# Patient Record
Sex: FEMALE | Race: WHITE | Employment: OTHER | URBAN - NONMETROPOLITAN AREA
[De-identification: names, ages, dates, MRNs, and addresses within clinical notes are randomized per-mention and may not be internally consistent; named-entity substitution may affect disease eponyms.]

---

## 2019-10-21 ENCOUNTER — HOSPITAL ENCOUNTER (OUTPATIENT)
Dept: CT IMAGING | Age: 79
Discharge: HOME | End: 2019-10-21
Payer: MEDICARE

## 2019-10-21 DIAGNOSIS — Z00.6 ENCOUNTER FOR EXAMINATION FOR NORMAL COMPARISON AND CONTROL IN CLINICAL RESEARCH PROGRAM: ICD-10-CM

## 2019-10-21 PROCEDURE — 3209999900 CT COMPARISON OF OUTSIDE FILMS

## 2019-10-22 ENCOUNTER — NURSE ONLY (OUTPATIENT)
Dept: LAB | Age: 79
End: 2019-10-22

## 2019-10-22 ENCOUNTER — OFFICE VISIT (OUTPATIENT)
Dept: PULMONOLOGY | Age: 79
End: 2019-10-22
Payer: MEDICARE

## 2019-10-22 VITALS
SYSTOLIC BLOOD PRESSURE: 132 MMHG | OXYGEN SATURATION: 98 % | HEART RATE: 76 BPM | DIASTOLIC BLOOD PRESSURE: 84 MMHG | TEMPERATURE: 98 F | BODY MASS INDEX: 24.66 KG/M2 | WEIGHT: 148 LBS | HEIGHT: 65 IN

## 2019-10-22 DIAGNOSIS — J47.9 BRONCHIECTASIS WITHOUT COMPLICATION (HCC): ICD-10-CM

## 2019-10-22 DIAGNOSIS — R93.89 ABNORMAL CT OF THE CHEST: Primary | ICD-10-CM

## 2019-10-22 DIAGNOSIS — R91.8 MULTIPLE LUNG NODULES ON CT: ICD-10-CM

## 2019-10-22 DIAGNOSIS — R93.89 ABNORMAL CT OF THE CHEST: ICD-10-CM

## 2019-10-22 LAB
RHEUMATOID FACTOR: 11 IU/ML (ref 0–13)
SEDIMENTATION RATE, ERYTHROCYTE: 14 MM/HR (ref 0–20)

## 2019-10-22 PROCEDURE — 99205 OFFICE O/P NEW HI 60 MIN: CPT | Performed by: INTERNAL MEDICINE

## 2019-10-22 RX ORDER — IBUPROFEN 200 MG
200 TABLET ORAL EVERY 6 HOURS PRN
COMMUNITY

## 2019-10-22 RX ORDER — AMLODIPINE BESYLATE 2.5 MG/1
2.5 TABLET ORAL DAILY
COMMUNITY

## 2019-10-22 RX ORDER — FLUTICASONE PROPIONATE 110 UG/1
2 AEROSOL, METERED RESPIRATORY (INHALATION) 2 TIMES DAILY
Qty: 1 INHALER | Refills: 11 | Status: SHIPPED | OUTPATIENT
Start: 2019-10-22 | End: 2019-10-23 | Stop reason: CLARIF

## 2019-10-22 RX ORDER — ALBUTEROL SULFATE 90 UG/1
2 AEROSOL, METERED RESPIRATORY (INHALATION) EVERY 6 HOURS PRN
Qty: 1 INHALER | Refills: 11 | Status: SHIPPED | OUTPATIENT
Start: 2019-10-22 | End: 2021-11-29

## 2019-10-23 ENCOUNTER — TELEPHONE (OUTPATIENT)
Dept: PULMONOLOGY | Age: 79
End: 2019-10-23

## 2019-10-23 DIAGNOSIS — J47.9 BRONCHIECTASIS WITHOUT COMPLICATION (HCC): Primary | ICD-10-CM

## 2019-10-25 ENCOUNTER — HOSPITAL ENCOUNTER (OUTPATIENT)
Age: 79
Setting detail: OUTPATIENT SURGERY
Discharge: HOME | End: 2019-10-25
Attending: INTERNAL MEDICINE | Admitting: INTERNAL MEDICINE
Payer: MEDICARE

## 2019-10-25 ENCOUNTER — ANESTHESIA (OUTPATIENT)
Dept: ENDOSCOPY | Age: 79
End: 2019-10-25
Payer: MEDICARE

## 2019-10-25 ENCOUNTER — APPOINTMENT (OUTPATIENT)
Dept: GENERAL RADIOLOGY | Age: 79
End: 2019-10-25
Attending: INTERNAL MEDICINE
Payer: MEDICARE

## 2019-10-25 ENCOUNTER — ANESTHESIA EVENT (OUTPATIENT)
Dept: ENDOSCOPY | Age: 79
End: 2019-10-25
Payer: MEDICARE

## 2019-10-25 VITALS
HEIGHT: 65 IN | OXYGEN SATURATION: 98 % | RESPIRATION RATE: 20 BRPM | HEART RATE: 77 BPM | DIASTOLIC BLOOD PRESSURE: 81 MMHG | SYSTOLIC BLOOD PRESSURE: 178 MMHG | TEMPERATURE: 96.9 F | WEIGHT: 146.6 LBS | BODY MASS INDEX: 24.43 KG/M2

## 2019-10-25 VITALS
DIASTOLIC BLOOD PRESSURE: 71 MMHG | SYSTOLIC BLOOD PRESSURE: 142 MMHG | OXYGEN SATURATION: 96 % | RESPIRATION RATE: 31 BRPM

## 2019-10-25 LAB
ANA SCREEN: NORMAL
ANGIOTENSIN CONVERTING ENZYME: 36 U/L (ref 9–67)
BAL CHARACTER: ABNORMAL
BAL COLLECTION SITE: ABNORMAL
BAL COLOR: ABNORMAL
CILIATED/EPITHELIAL CELLS BAL: 4 % (ref 0–5)
CYCLIC CITRULLIN PEPTIDE AB: 3 UNITS (ref 0–19)
CYTOLOGY-NON GYN: NORMAL
EOSINOPHILS BAL: 1 % (ref 0–1)
LYMPHOCYTES, BAL: 6 % (ref 10–15)
MACROPHAGE/MONOCYTE BAL: 4 % (ref 86–100)
PATHOLOGIST REVIEW: ABNORMAL
RBC BAL: 5170 /CUMM
SEGMENTED NEUTROPHILS, BAL: 85 % (ref 0–3)
TOTAL NUCLEATED CELLS BAL: 300 /CUMM
TOTAL VOLUME RECEIVED BAL: 40 ML

## 2019-10-25 PROCEDURE — 2500000003 HC RX 250 WO HCPCS: Performed by: NURSE ANESTHETIST, CERTIFIED REGISTERED

## 2019-10-25 PROCEDURE — 88312 SPECIAL STAINS GROUP 1: CPT

## 2019-10-25 PROCEDURE — 71045 X-RAY EXAM CHEST 1 VIEW: CPT

## 2019-10-25 PROCEDURE — 88305 TISSUE EXAM BY PATHOLOGIST: CPT

## 2019-10-25 PROCEDURE — 7100000001 HC PACU RECOVERY - ADDTL 15 MIN: Performed by: INTERNAL MEDICINE

## 2019-10-25 PROCEDURE — 87116 MYCOBACTERIA CULTURE: CPT

## 2019-10-25 PROCEDURE — 87081 CULTURE SCREEN ONLY: CPT

## 2019-10-25 PROCEDURE — 88104 CYTOPATH FL NONGYN SMEARS: CPT

## 2019-10-25 PROCEDURE — 7100000000 HC PACU RECOVERY - FIRST 15 MIN: Performed by: INTERNAL MEDICINE

## 2019-10-25 PROCEDURE — 31623 DX BRONCHOSCOPE/BRUSH: CPT | Performed by: INTERNAL MEDICINE

## 2019-10-25 PROCEDURE — 87278 LEGION PNEUMOPHILIA AG IF: CPT

## 2019-10-25 PROCEDURE — 87077 CULTURE AEROBIC IDENTIFY: CPT

## 2019-10-25 PROCEDURE — 6370000000 HC RX 637 (ALT 250 FOR IP)

## 2019-10-25 PROCEDURE — 2580000003 HC RX 258: Performed by: INTERNAL MEDICINE

## 2019-10-25 PROCEDURE — 3609027000 HC BRONCHOSCOPY: Performed by: INTERNAL MEDICINE

## 2019-10-25 PROCEDURE — 88108 CYTOPATH CONCENTRATE TECH: CPT

## 2019-10-25 PROCEDURE — 87075 CULTR BACTERIA EXCEPT BLOOD: CPT

## 2019-10-25 PROCEDURE — 3609010800 HC BRONCHOSCOPY ALVEOLAR LAVAGE: Performed by: INTERNAL MEDICINE

## 2019-10-25 PROCEDURE — 3700000000 HC ANESTHESIA ATTENDED CARE: Performed by: INTERNAL MEDICINE

## 2019-10-25 PROCEDURE — 3700000001 HC ADD 15 MINUTES (ANESTHESIA): Performed by: INTERNAL MEDICINE

## 2019-10-25 PROCEDURE — 2709999900 HC NON-CHARGEABLE SUPPLY: Performed by: INTERNAL MEDICINE

## 2019-10-25 PROCEDURE — 87255 GENET VIRUS ISOLATE HSV: CPT

## 2019-10-25 PROCEDURE — 31624 DX BRONCHOSCOPE/LAVAGE: CPT | Performed by: INTERNAL MEDICINE

## 2019-10-25 PROCEDURE — 87102 FUNGUS ISOLATION CULTURE: CPT

## 2019-10-25 PROCEDURE — 87206 SMEAR FLUORESCENT/ACID STAI: CPT

## 2019-10-25 PROCEDURE — 2500000003 HC RX 250 WO HCPCS: Performed by: INTERNAL MEDICINE

## 2019-10-25 PROCEDURE — 31625 BRONCHOSCOPY W/BIOPSY(S): CPT | Performed by: INTERNAL MEDICINE

## 2019-10-25 PROCEDURE — 6360000002 HC RX W HCPCS: Performed by: NURSE ANESTHETIST, CERTIFIED REGISTERED

## 2019-10-25 PROCEDURE — 3609011100 HC BRONCHOSCOPY BRUSHINGS: Performed by: INTERNAL MEDICINE

## 2019-10-25 PROCEDURE — 88112 CYTOPATH CELL ENHANCE TECH: CPT

## 2019-10-25 PROCEDURE — 87205 SMEAR GRAM STAIN: CPT

## 2019-10-25 PROCEDURE — 2500000003 HC RX 250 WO HCPCS

## 2019-10-25 PROCEDURE — 89051 BODY FLUID CELL COUNT: CPT

## 2019-10-25 PROCEDURE — 87070 CULTURE OTHR SPECIMN AEROBIC: CPT

## 2019-10-25 PROCEDURE — 6370000000 HC RX 637 (ALT 250 FOR IP): Performed by: INTERNAL MEDICINE

## 2019-10-25 PROCEDURE — 6360000002 HC RX W HCPCS

## 2019-10-25 RX ORDER — GLYCOPYRROLATE 1 MG/5 ML
SYRINGE (ML) INTRAVENOUS PRN
Status: DISCONTINUED | OUTPATIENT
Start: 2019-10-25 | End: 2019-10-25 | Stop reason: SDUPTHER

## 2019-10-25 RX ORDER — LIDOCAINE HYDROCHLORIDE 10 MG/ML
INJECTION, SOLUTION INFILTRATION; PERINEURAL PRN
Status: DISCONTINUED | OUTPATIENT
Start: 2019-10-25 | End: 2019-10-25 | Stop reason: ALTCHOICE

## 2019-10-25 RX ORDER — MIDAZOLAM HYDROCHLORIDE 1 MG/ML
INJECTION INTRAMUSCULAR; INTRAVENOUS PRN
Status: DISCONTINUED | OUTPATIENT
Start: 2019-10-25 | End: 2019-10-25 | Stop reason: SDUPTHER

## 2019-10-25 RX ORDER — PROPOFOL 10 MG/ML
INJECTION, EMULSION INTRAVENOUS PRN
Status: DISCONTINUED | OUTPATIENT
Start: 2019-10-25 | End: 2019-10-25 | Stop reason: SDUPTHER

## 2019-10-25 RX ORDER — SODIUM CHLORIDE 450 MG/100ML
INJECTION, SOLUTION INTRAVENOUS CONTINUOUS
Status: DISCONTINUED | OUTPATIENT
Start: 2019-10-25 | End: 2019-10-25 | Stop reason: HOSPADM

## 2019-10-25 RX ORDER — OXYMETAZOLINE HYDROCHLORIDE 0.05 G/100ML
SPRAY NASAL PRN
Status: DISCONTINUED | OUTPATIENT
Start: 2019-10-25 | End: 2019-10-25 | Stop reason: ALTCHOICE

## 2019-10-25 RX ORDER — LIDOCAINE HYDROCHLORIDE 20 MG/ML
INJECTION, SOLUTION INFILTRATION; PERINEURAL PRN
Status: DISCONTINUED | OUTPATIENT
Start: 2019-10-25 | End: 2019-10-25 | Stop reason: ALTCHOICE

## 2019-10-25 RX ADMIN — PROPOFOL 10 MG: 10 INJECTION, EMULSION INTRAVENOUS at 08:02

## 2019-10-25 RX ADMIN — PROPOFOL 10 MG: 10 INJECTION, EMULSION INTRAVENOUS at 07:48

## 2019-10-25 RX ADMIN — PROPOFOL 20 MG: 10 INJECTION, EMULSION INTRAVENOUS at 07:59

## 2019-10-25 RX ADMIN — PROPOFOL 30 MG: 10 INJECTION, EMULSION INTRAVENOUS at 07:37

## 2019-10-25 RX ADMIN — Medication 0.2 MG: at 07:28

## 2019-10-25 RX ADMIN — PROPOFOL 20 MG: 10 INJECTION, EMULSION INTRAVENOUS at 07:39

## 2019-10-25 RX ADMIN — PROPOFOL 20 MG: 10 INJECTION, EMULSION INTRAVENOUS at 07:54

## 2019-10-25 RX ADMIN — PROPOFOL 10 MG: 10 INJECTION, EMULSION INTRAVENOUS at 07:51

## 2019-10-25 RX ADMIN — PROPOFOL 10 MG: 10 INJECTION, EMULSION INTRAVENOUS at 07:45

## 2019-10-25 RX ADMIN — PROPOFOL 20 MG: 10 INJECTION, EMULSION INTRAVENOUS at 07:57

## 2019-10-25 RX ADMIN — PROPOFOL 10 MG: 10 INJECTION, EMULSION INTRAVENOUS at 07:42

## 2019-10-25 RX ADMIN — MIDAZOLAM HYDROCHLORIDE 2 MG: 1 INJECTION, SOLUTION INTRAMUSCULAR; INTRAVENOUS at 07:34

## 2019-10-25 RX ADMIN — SODIUM CHLORIDE: 4.5 INJECTION, SOLUTION INTRAVENOUS at 07:17

## 2019-10-25 ASSESSMENT — PAIN SCALES - GENERAL
PAINLEVEL_OUTOF10: 0

## 2019-10-25 ASSESSMENT — PAIN - FUNCTIONAL ASSESSMENT: PAIN_FUNCTIONAL_ASSESSMENT: 0-10

## 2019-10-26 LAB
ANTI JO-1 IGG: 0 AU/ML (ref 0–40)
ANTI RNP AB: 1 AU/ML (ref 0–40)
ANTI-SMITH: 0 AU/ML (ref 0–40)
CENTROMERE AB SCREEN: 0 AU/ML (ref 0–40)
SCLERODERMA (SCL-70) AB: 2 AU/ML (ref 0–40)

## 2019-10-27 LAB
GRAM STAIN RESULT: ABNORMAL
GRAM STAIN RESULT: ABNORMAL
LEGIONELLA PNEUMOPHILIA DFA SOURCE: NORMAL
LEGIONELLA, DFA: NEGATIVE
MISC. #1 REFERENCE GROUP TEST: NORMAL
ORGANISM: ABNORMAL
ORGANISM: ABNORMAL
RESPIRATORY CULTURE: ABNORMAL
RESPIRATORY CULTURE: ABNORMAL

## 2019-10-29 DIAGNOSIS — J14: Primary | ICD-10-CM

## 2019-10-29 RX ORDER — LEVOFLOXACIN 500 MG/1
500 TABLET, FILM COATED ORAL DAILY
Qty: 7 TABLET | Refills: 0 | Status: SHIPPED | OUTPATIENT
Start: 2019-10-29 | End: 2019-11-05

## 2019-10-30 ENCOUNTER — TELEPHONE (OUTPATIENT)
Dept: PULMONOLOGY | Age: 79
End: 2019-10-30

## 2019-10-30 LAB
AEROBIC CULTURE: ABNORMAL
ANAEROBIC CULTURE: ABNORMAL
GRAM STAIN RESULT: ABNORMAL
ORGANISM: ABNORMAL

## 2019-10-31 LAB
VIRAL CULTURE,RAPID,RESPIRATOR: NORMAL
VIRAL CULTURE,RAPID,RESPIRATOR: NORMAL

## 2019-11-02 LAB — MISC. #1 REFERENCE GROUP TEST: NORMAL

## 2019-11-03 LAB — LEGIONELLA SPECIES CULTURE: NORMAL

## 2019-11-11 ENCOUNTER — TELEPHONE (OUTPATIENT)
Dept: PULMONOLOGY | Age: 79
End: 2019-11-11

## 2019-11-18 ENCOUNTER — OFFICE VISIT (OUTPATIENT)
Dept: PULMONOLOGY | Age: 79
End: 2019-11-18
Payer: MEDICARE

## 2019-11-18 VITALS
SYSTOLIC BLOOD PRESSURE: 124 MMHG | HEIGHT: 65 IN | WEIGHT: 145.2 LBS | BODY MASS INDEX: 24.19 KG/M2 | DIASTOLIC BLOOD PRESSURE: 62 MMHG | TEMPERATURE: 99.5 F | OXYGEN SATURATION: 98 % | HEART RATE: 82 BPM

## 2019-11-18 DIAGNOSIS — J47.9 BRONCHIECTASIS WITHOUT COMPLICATION (HCC): ICD-10-CM

## 2019-11-18 DIAGNOSIS — R91.1 PULMONARY NODULE, LEFT: ICD-10-CM

## 2019-11-18 DIAGNOSIS — J01.00 ACUTE MAXILLARY SINUSITIS, RECURRENCE NOT SPECIFIED: Primary | ICD-10-CM

## 2019-11-18 DIAGNOSIS — R93.89 ABNORMAL CT OF THE CHEST: ICD-10-CM

## 2019-11-18 DIAGNOSIS — R91.1 INCIDENTAL LUNG NODULE, > 3MM AND < 8MM: ICD-10-CM

## 2019-11-18 PROCEDURE — 99215 OFFICE O/P EST HI 40 MIN: CPT | Performed by: INTERNAL MEDICINE

## 2019-11-18 RX ORDER — DOXYCYCLINE HYCLATE 100 MG/1
100 CAPSULE ORAL 2 TIMES DAILY
Qty: 16 CAPSULE | Refills: 0 | Status: SHIPPED | OUTPATIENT
Start: 2019-11-18 | End: 2019-11-26

## 2019-11-24 LAB
FUNGUS IDENTIFIED: NORMAL
FUNGUS SMEAR: NORMAL

## 2019-12-09 LAB
AFB CULTURE & SMEAR: NORMAL
AFB CULTURE & SMEAR: NORMAL

## 2020-01-02 ENCOUNTER — TELEPHONE (OUTPATIENT)
Dept: PULMONOLOGY | Age: 80
End: 2020-01-02

## 2020-01-02 NOTE — TELEPHONE ENCOUNTER
I called patient at given phone number i.e Home/Mobile and spoke with her over phone. I informed in detail about my plan as follows.  -Stop using her Q primo until her hoarseness of voice and redness of tongue completely resolved. She denies any white patches on her tongue or cheeks. To restart Q primo with a spacer device once her symptoms completely resolved. -Continue using Pro air HFA 2 puffs Q6h prn.  -she was given a prescription for a Spacer device to use with her current inhalers- sent to her pharmacy. I advised her to keep scheduled appointment with my clinic with out fail. She verbalizes understanding.

## 2020-01-02 NOTE — TELEPHONE ENCOUNTER
Tongue is red and sore, throat is hoarse. She is rinsing her mouth out after every use. Her dentist recommended a rinse ( Merly silver mouth rinse). Is ok to try this ?

## 2020-01-06 ENCOUNTER — TELEPHONE (OUTPATIENT)
Dept: PULMONOLOGY | Age: 80
End: 2020-01-06

## 2020-01-06 NOTE — TELEPHONE ENCOUNTER
Nighat with [facility] phoned wanting to clarify if Kieran Woodsonn should be using spacer with Qvar Redihaler. Qvar is Breath Activating and packing states not to use with spacer. Nighat just needing clarification? Please advise.

## 2020-01-07 NOTE — TELEPHONE ENCOUNTER
I called Hansel Vaz and explained the change and discussed with Nighat at [employer]. Both did verbalize understanding. Thank you.

## 2020-03-16 ENCOUNTER — TELEPHONE (OUTPATIENT)
Dept: PULMONOLOGY | Age: 80
End: 2020-03-16

## 2020-03-25 ENCOUNTER — TELEPHONE (OUTPATIENT)
Dept: PULMONOLOGY | Age: 80
End: 2020-03-25

## 2020-04-07 ENCOUNTER — TELEPHONE (OUTPATIENT)
Dept: PULMONOLOGY | Age: 80
End: 2020-04-07

## 2020-04-07 NOTE — TELEPHONE ENCOUNTER
Carmen Reyez phoned in complaining that she is still having soreness on her tongue and roof of her mouth, She has completed medication you had prescribed and is asking if would like to provide another order for Nystatin or what do you advise her to do? Carmen Reyez also mentioned that her  uses a CPAP at night and he uses Prime clean while using and she can notice a difference in the air, could this possibly be irritating her mouth? Please advise.

## 2020-04-17 NOTE — PROGRESS NOTES
bronchopneumonia along with Centrilobular nodules in both upper lobes-S/p Bronchoscopy and found to have Haemophilus influenzae infection. She was treated with Levaquin for 7days. Will follow with repeat imaging.  -Acute maxillary sinusitis. -Bronchiectasis - under control  -Hx of tobacco smoking for 4years. She quit smoking several years back. Recommendations/Plan:  -She was advised to stop using Asmanex HFA 100mcg, 2puffs  BID until her soreness of tongue completely resolves  -Continue patient on Albuterol HFA 90mcg/Spray MDI, 2puffs  Q6Hprn.  -She was advised to take Over the counter multivitamins 1 tablet per day. -Stop using Nystatin oral rinses  - Patient educated to update his pneumococcal vaccine with family physician and take influenza vaccine in coming season with out fail.   -Lv Lopez was advised to make early appointment with my clinic if she develops any constitutional symptoms including loss of weight, poor appetite or hemoptysis. She verbalizes under standing.  - Keep scheduled appointment for High resolution CT scan of chest without IV contrast to follow multilobar bronchopneumonia along with Centrilobular nodules in both upper lobes. -She was advised to make a follow up appt with her dentist or family physician if her tongue pain don't improve or recurs for further management. - She was advised to keep scheduledfollow up with my clinic on 10/12/20 with recommended test I.e HRCT of chest and to go over the results. Patient advised to make early appointment if needed. - Patient educated about my impression and plan. Patient verbalizes understanding.

## 2020-04-20 ENCOUNTER — OFFICE VISIT (OUTPATIENT)
Dept: PULMONOLOGY | Age: 80
End: 2020-04-20
Payer: MEDICARE

## 2020-04-20 VITALS
TEMPERATURE: 96.7 F | WEIGHT: 146 LBS | DIASTOLIC BLOOD PRESSURE: 76 MMHG | HEART RATE: 79 BPM | HEIGHT: 65 IN | OXYGEN SATURATION: 98 % | SYSTOLIC BLOOD PRESSURE: 122 MMHG | BODY MASS INDEX: 24.32 KG/M2

## 2020-04-20 PROCEDURE — 99213 OFFICE O/P EST LOW 20 MIN: CPT | Performed by: INTERNAL MEDICINE

## 2020-09-16 NOTE — PROGRESS NOTES
[facility]  Pulmonary medicine clinic follow up note. Patient: William Schmidt  : 1940      Chief complaint/Huslia:     Lung Nodule Screening     []? Qualifies    [x]? Does not qualify   []? Declined    []? Completed    Chief complaint and Huslia:  William Schmidt is a 79 y. o.oldfemale came for follow up regarding her abnormal HRCT of chest and bronchiectasis after having a HRCT of chest and full PFTS as requested. On today's questioning:  She denies cough or expectoration. She denies hemoptysis. She denies fever or chills. She denies recent hospitalizations or emergency room visits. She denies recent loss of weight or appetite changes. She denies recent decline in functional status. She is still able to mow her lawn with out any difficulty. She was prescribed with Asmanex HFA 100mcg, 2puffs  BID at the last visit as a treatment for her bronchiectasis. How ever she developed oral thrush and glossitis. She was treated with Oral Nystatin rinses. Her soreness of tongue is improving. She is not using her Albuterol HFA 90mcg/Spray MDI, 2puffs  Q6Hprn. She underwent Flexible diagnostic fiberoptic bronchoscopy with fluoroscopy. During procedure Bronch washings obtained from bilateral tracheobronchial tree including left lingula and bilateral upper lobes. Bronchioalveolar lavage obtained from right middle lobe medial segment. Trans bronchial lung biopsies were performed by using biopsy forceps from right middle lobe medial segment under fluroscopy guidance. Cytology brush specimen was obtained from  right middle lobe medial segment under fluroscopy guidance. Microbiology brush specimen was obtained from  right middle lobe medial segment under fluroscopy guidance on 10/25/19. She was found to have Haemophilus influenzae infection in bronchoscopy specimen. She was treated with Levaquin for 7days.     She underwent chest CT scan on:10/1/19  The above chest  CT was performed at: [facility] [place], [place]. She is currently not using any oxygen supplementation at rest, exercise or during sleep/at night time. No recent hospitalizations or emergency room visits. She had normal Mammogram: Normal in . She had last Colonoscopy performed on: Negative for malignancy- 12/15/15. She denies any hx of connective tissue diseases including Systemic lupus Erythematosus, Rheumatoid arthritis or Sarcoidosis etc.      Social History:  Occupation:  She is current working: No  Type of profession: retired. 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 Years of tobacco smokin. Quit smoking: Yes. Quit year:     History of recreational or IV drug use in the past:NO     History of exposure to coal mines/coal dust: NO  History of exposure to foundry dust/welding: NO  History of exposure to quarry/silica/sandblasting: NO  History of exposure to asbestos/working with breaks/ships: NO  History of exposure to farm dust: NO  History of recent travel to long distances: NO  History of exposure to birds, pigeons, or chickens in the past:NO  Pet animals at home:No      History of pulmonary embolism in the past: No            History of DVT in the past:No                        Review of Systems:   General/Constitutional: she remained at same weight from the last visit with normal appetite. No fever or chills. HENT: Sore throat. She had a hx of sinus disease in the past. She used to follow with Dr. Sammy Romeo at [facility] in the past. She don't want to go back to see him. She want to see another ENT physician at [facility]. Eyes: Negative. Upper respiratory tract: No nasal stuffiness or post nasal drip.   Lower respiratory tract/ lungs: No cough or ibuprofen (ADVIL;MOTRIN) 200 MG tablet Take 200 mg by mouth every 6 hours as needed for Pain      albuterol sulfate HFA (PROAIR HFA) 108 (90 Base) MCG/ACT inhaler Inhale 2 puffs into the lungs every 6 hours as needed for Wheezing or Shortness of Breath 1 Inhaler 11    mometasone (ASMANEX HFA) 100 MCG/ACT AERO inhaler Inhale 2 puffs into the lungs 2 times daily Rinse mouth after its use. (Patient not taking: Reported on 10/12/2020) 1 Inhaler 11    Spacer/Aero-Holding Chambers LUCHO 1 Device by Does not apply route daily as needed (To use with her Q primo) (Patient not taking: Reported on 10/12/2020) 1 Device 0     No current facility-administered medications for this visit. No family history on file. Physical Exam     VITALS:  /78 (Site: Left Upper Arm, Position: Sitting, Cuff Size: Medium Adult)   Pulse 94   Temp 96.6 °F (35.9 °C)   Ht 5' 5\" (1.651 m)   Wt 146 lb 3.2 oz (66.3 kg)   SpO2 96% Comment: on RA  BMI 24.33 kg/m²   Nursing note and vitals reviewed. Constitutional: Patient appears moderately built and moderately nourished. No distress. Patient is oriented to person, place, and time. HENT:   Head: Normocephalic and atraumatic. Right Ear: External ear normal.   Left Ear: External ear normal.   Mouth/Throat: Oropharynx is clear and moist.  No oral thrush. Eyes: Conjunctivae are normal. Pupils are equal, round, and reactive to light. No scleral icterus. Neck: Neck supple. No JVD present. No tracheal deviation present. Cardiovascular: Normal rate, regular rhythm, normal heart sounds. No murmur heard. Pulmonary/Chest: Effort normal and breath sounds normal. No stridor. No respiratory distress. No wheezes. No rales. Patient exhibits no tenderness. Abdominal: Soft. Patient exhibits no distension. No tenderness. Musculoskeletal: Normal range of motion. Extremities: Patient exhibits no edema and no tenderness. Lymphadenopathy:  No cervical adenopathy.    Neurological: Patient is alert and oriented to person, place, and time. Skin: Skin is warm and dry. Patient is not diaphoretic. Psychiatric: Patient  has a normal mood and affect. Patient behavior is normal.   .    Diagnostic Data:    Radiological Data: 10/2/2019            Connective tissue work up results:  Date: 10/22/19    KATRIN( Antinuclear antibody):  Negative                     [x]   Anti Centromere Ab screen: Negative                      [x]   Anti RNP (MARCELO-Ab):   Negative                                 [x]   Anti Scleroderma (SCL-70):  Negative                     [x]   Anti Christa-1:   Negative                                                  [x]   Anti Smith antibody:  Negative                                [x]       RA ( Rheumatoid factor):  Negative-11                    [x]   Anti CCP:   Negative                                             [x]   ACE(  level):    Negative-36                                     [x]   ESR ( Sed rate): Negative-14                                    [x]       Hypersensitive Pneumonitis panel:      11/2/2019  6:43 PM - Iram Ann Incoming Lab Results From Soft     Component Collected Lab   [facility]FERENCE GROUP TEST 10/22/2019 12:44 PM [facility]   SEE BELOW    Comment:   Test name                     Result Flag Units  RefRange   -------------------------------------------------------------   A. fumigatus #1 Ab, Precipitin                          None Detected             None Detected   A. fumigatus #6 Ab, Precipitin                          None Detected             None Detected   A. pullulans Ab, Precipitin                          None Detected             None Detected   Testing includes antibodies directed at Aureobasidium pullulans,   Aspergillus flavus, Aspergillus fumigatus #1, Aspergillus   fumigatus #2, Aspergillus fumigatus #3, Aspergillus fumigatus #6,   Micropolyspora faeni, [place] Serum, Saccharomonospora viridis,   Thermoactinomyces candidus, and Thermoactinomyces vulgaris #1. [place] Serum Ab, Precipitin                          None Detected             None Detected   M. faeni Ab, Precipitin                          None Detected             None Detected   T. vulgaris #1 Ab, Precipitin                          None Detected             None Detected   A. flavus Ab, Precipitin                          None Detected             None Detected   A. fumigatus #2 Ab, Precipitin                          None Detected             None Detected   A. fumigatus #3 Ab, Precipitin                          None Detected             None Detected   S. viridis Ab, Precipitin                          None Detected             None Detected   T. candidus Ab, Precipitin                          None Detected             None Detected   Allergen, Fungi/Mold, Phoma betae IgE                                  <0.10        kU/L <=0.34   Allergen, Food, Beef IgE       <0.10        kU/L <=0.34   Allergen, Food, Pork IgE       <0.10        kU/L <=0.34   Allergen, Animal, Feather Mix IgE                               Negative        kU/L Negative   Allergen, Interp, Immunocap Score IgE                               See Note   REFERENCE INTERVAL: Allergen, Interpretation    Less than 0.10 kU/L. ... [place] Hilt [place] Hilt Class 0... [place] Hilt Lon[place] Hilt No significant level detected    0.10-0.34 kU/L. ......... Lonnie Hilt Class 0/1. [place] Hilt [place] Hilt Clinical relevance   undetermined    0.35-0.70 kU/L. ......... [place] Hilt Class 1... [place] Hilt [place] Hilt Low    0.71-3.50 kU/L. ......... [place] H[place] Class 2. .. Lonnie Hilt Lonnie Hilt Moderate    3.51-17.50 kU/L. ........ Lonnie Hilt Class 3... Lonnie Hilt [place] Hilt High    17.51-50.00 kU/L. ....... Lonnie Hilt Class 4. .. Lonnie Hilt Lonnie Hilt Very High    50..00 kU/L. ...... [place] H[place] Class 5. .. [place] Hilt [place] Hilt Very High    Greater than 100.00kU/L. [place] H[place] Class 6. .. [place] Hilt [place] Hilt Very High   Allergen results of 0.10-0.34 kU/L are intended for specialist use   as the clinical relevance is undetermined.  Even though increasing   ranges are reflective of increasing concentrations of   allergen-specific IgE, these concentrations may not correlate with   the degree of clinical response or skin testing results when   challenged with a specific allergen. The correlation of allergy   laboratory results with clinical history and in vivo reactivity to   specific allergens is essential. A negative test may not rule out   clinical allergy or even anaphylaxis. Performed by [facility] , [address] [phone]   www. Tila Rabago MD, Lab. Director        Bronchoscopy results:  Hospital performed: [address] Date of procedure: 10/25/19  Procedure: Flexible diagnostic fiberoptic bronchoscopy with fluoroscopy. During procedure Bronch washings obtained from bilateral tracheobronchial tree including left lingula and bilateral upper lobes. Bronchioalveolar lavage obtained from right middle lobe medial segment. Trans bronchial lung biopsies were performed by using biopsy forceps from right middle lobe medial segment under fluroscopy guidance. Cytology brush specimen was obtained from  right middle lobe medial segment under fluroscopy guidance. Microbiology brush specimen was obtained from  right middle lobe medial segment under fluroscopy guidence. BAL ( Broncho alveolar lavage):    Acid fast bacilli:  Negative                          [x]   Fungal cultures:  Negative                         [x]   Routine cultures: Haemophilus influenzae     [x]   Viral cultures:      Negative. [x]   Legionella cultures:Negative         [x]   Pneumocystis Jerovicii stain: Negative. [x]   Cytology:  Negative.                                    []     BAL Differential:  10/25/2019 12:32 PM - Maria Elena Ann Incoming Lab Results From Soft     Component Value Ref Range & Units Status Collected Lab   BAL Color PINK   Final 10/25/2019 11:32 AM [facility]   BAL Character CLOUDY/MILKY   Final 10/25/2019 11:32 AM [facility]   BAL Collection Site BAL   Final 10/25/2019 11:32 AM [facility]   Total Volume Received BAL 40  ml Final 10/25/2019 11:32 AM [facility]   Total Nucleated Cells   /cumm Final 10/25/2019 11:32 AM [facility]   RBC BAL 5170  /cumm Final 10/25/2019 11:32 AM [facility]   Macrophage/Monocyte BAL 4Low   86 - 100 % Final 10/25/2019 11:32 AM [facility]   Lymphocytes, BAL 6Low   10 - 15 % Final 10/25/2019 11:32 AM [facility]   Segmented Neutrophils, BAL 85High   0 - 3 % Final 10/25/2019 11:32 AM [facility] Lab   intracellular bacteria present. MD SB   Eosinophils BAL 1  0 - 1 % Final 10/25/2019 11:32 AM [facility]   Ciliated/Epithelial Cells BAL 4  0 - 5 % Final 10/25/2019 11:32 AM [facility]   Pathologist Review EKM   Final 10/25/2019 11:32 AM [facility]         Bronch washings:     Acid fast bacilli:  Negative                          [x]   Fungal cultures:  Negative                         [x]   Routine cultures:  Haemophilus influenzae. [x]   Viral cultures:      Negative. [x]       Transbronchial biopsy:  [facility]                                    RECV: 10/25/2019  [address]                                    RPTD: 10/26/2019  [facility], [address]   Clinical Information: ABNORMAL CT    FINAL DIAGNOSIS:  Lung, right middle lobe, medial segment biopsy:   Negative for malignancy. No intact pulmonary alveolar tissue for evaluation. See microscopic. Transbronchial brushes: Haemophilus influenzae. No fungus. HRCT of chest: Not done- please see following addendum. Addendum done on 3/26/20 at 10:28 AM EDT:  I spoke with Dr. Deb Burnette radiologist as a part of peer to peer discussion to get above HRCT to get approved. How ever he told me that the CT chest ordered above is too soon. It should be done after September, 2020.     Please reschedule her HRCT of chest to be done after September, 2020.  Please arrange for follow up 1 week after above HRCT of chest.   Will also order full PFTS to be done at least 1 week before clinic visit. HRCT of chest: 10/6/2020        The latest/above CT chest (Left) was compared with her old CT chest ( Right).     PFTS: 10/6/2020                Assessment:  -Abnormal CT chest dated 10/1/19 reported as multilobar bronchopneumonia along with Centrilobular nodules in both upper lobes-S/p Bronchoscopy and found to have Haemophilus influenzae infection. She was treated with Levaquin in the past for 7days. She is clinically remain asymptomatic with good functional status. She also refused to go for any further testing for her worsening of HRCT findings due to her asymptomatic nature. ? Silent chronic aspiration related  -Dysphagia for liquids- need further evaluation.  -Hx of Acute maxillary sinusitis. Treated with antibiotics  -Bronchiectasis - under control. She refused to use any maintenance inhaled steroid due to development of sore throat.  -Hx of tobacco smoking for 4years. She quit smoking several years back. Recommendations/Plan:  -Schedule patient for modified barium swallow (MBS) to evaluate for ? Silent aspiration as an etiology for chronic cough. -She was advised and instructed to call my office with in 1 to 2 days after having  modified barium swallow (MBS) to go over the test results and further management.  She verbalizes understanding.   -Continue patient on Albuterol HFA 90mcg/Spray MDI, 2puffs  Q6Hprn.  -Schedule patient for Ear, Nose and Throat specialist consultation with Dr. Annabelle Hines Specialist at [facility] as soon as possible for further evaluation of un resolving sore throat with hx of chronic sinusitis.  -Patient educated to update his pneumococcal vaccine with family physician and take influenza vaccine in coming season with out fail.   -Manohar Nicholson was advised to make early appointment with my clinic if she develops any constitutional symptoms including loss of weight, poor appetite or hemoptysis. She verbalizes under standing.  - Schedule patient for CT scan of chest without IV contrast in 1year to follow abnormal CT Chest with hx of multilobar bronchopneumonia along with Centrilobular nodules in both lungs.  -The pathophysiology of reflux is discussed. Anti-reflux measures such as raising the head of the bed, avoiding tight clothing or belts, avoiding eating late at night and not lying down shortly after mealtime and achieving weight loss are discussed. Avoid ASA, NSAID's, caffeine, peppermints, alcohol and tobacco.   -Lieutenant Francis was advised to make early appointment with my clinic if she develops any constitutional symptoms including loss of weight, poor appetite or hemoptysis. She verbalizes under standing.  - Schedule patient for follow up with my clinic in 1year with recommended test I.e CT chest with out contrast. Patient advised to make early appointment if needed. - Patient educated about my impression and plan. Patient verbalizes understanding. Total time spent interviewing the patient, evaluating lab data and X-ray data and processing orders was 45 Minutes. I personally spent more than 50% of the appointment time face to face with the patient providing counseling and coordinating the patient's care.

## 2020-10-08 ENCOUNTER — HOSPITAL ENCOUNTER (OUTPATIENT)
Dept: CT IMAGING | Age: 80
Discharge: HOME | End: 2020-10-08

## 2020-10-12 ENCOUNTER — OFFICE VISIT (OUTPATIENT)
Dept: PULMONOLOGY | Age: 80
End: 2020-10-12
Payer: MEDICARE

## 2020-10-12 VITALS
SYSTOLIC BLOOD PRESSURE: 136 MMHG | BODY MASS INDEX: 24.36 KG/M2 | HEIGHT: 65 IN | WEIGHT: 146.2 LBS | DIASTOLIC BLOOD PRESSURE: 78 MMHG | TEMPERATURE: 96.6 F | HEART RATE: 94 BPM | OXYGEN SATURATION: 96 %

## 2020-10-12 PROCEDURE — 99215 OFFICE O/P EST HI 40 MIN: CPT | Performed by: INTERNAL MEDICINE

## 2020-10-12 NOTE — PATIENT INSTRUCTIONS
Recommendations/Plan:  -Schedule patient for modified barium swallow (MBS) to evaluate for ? Silent aspiration as an etiology for chronic cough. -She was advised and instructed to call my office with in 1 to 2 days after having  modified barium swallow (MBS) to go over the test results and further management. She verbalizes understanding.   -Continue patient on Albuterol HFA 90mcg/Spray MDI, 2puffs  Q6Hprn.  -Schedule patient for Ear, Nose and Throat specialist consultation with Dr. Yokasta Ferrer Specialist at [address] as soon as possible for further evaluation of un resolving sore throat with hx of chronic sinusitis.  -Patient educated to update his pneumococcal vaccine with family physician and take influenza vaccine in coming season with out fail.   -Richard Frey was advised to make early appointment with my clinic if she develops any constitutional symptoms including loss of weight, poor appetite or hemoptysis. She verbalizes under standing.  - Schedule patient for CT scan of chest without IV contrast in 1year to follow abnormal CT Chest with hx of multilobar bronchopneumonia along with Centrilobular nodules in both lungs.  -The pathophysiology of reflux is discussed. Anti-reflux measures such as raising the head of the bed, avoiding tight clothing or belts, avoiding eating late at night and not lying down shortly after mealtime and achieving weight loss are discussed. Avoid ASA, NSAID's, caffeine, peppermints, alcohol and tobacco.   -Richard Frey was advised to make early appointment with my clinic if she develops any constitutional symptoms including loss of weight, poor appetite or hemoptysis. She verbalizes under standing.  - Schedule patient for follow up with my clinic in 1year with recommended test I.e CT chest with out contrast. Patient advised to make early appointment if needed. - Patient educated about my impression and plan.  Patient verbalizes understanding.

## 2021-10-08 DIAGNOSIS — J32.8 OTHER CHRONIC SINUSITIS: ICD-10-CM

## 2021-10-08 DIAGNOSIS — R93.89 ABNORMAL CT OF THE CHEST: ICD-10-CM

## 2021-10-08 DIAGNOSIS — J02.9 SORE THROAT: ICD-10-CM

## 2021-10-08 DIAGNOSIS — J47.9 BRONCHIECTASIS WITHOUT COMPLICATION (HCC): ICD-10-CM

## 2021-10-31 NOTE — PROGRESS NOTES
[facility]  Pulmonary medicine clinic follow up note. Patient: Tere Orosco  : 1940      Chief complaint/[place]:     Lung Nodule Screening     []? Qualifies    [x]? Does not qualify   []? Declined    []? Completed    Chief complaint and [place]:  Tere Orosco is a [de-identified] y. o.oldfemale came for follow up regarding her abnormal CT of chest and bronchiectasis after having a CT of chest. She is currently on treatment with antibiotic therapy I.e Cefdnir by Dr. Cristi Thorne MD for her Chronic maxillary sinusitis. She is waiting for sinus surgery 2022 by Dr. Cristi Thorne MD    On today's questioning:  She denies cough or expectoration. She denies hemoptysis. She denies fever or chills. She denies recent hospitalizations or emergency room visits. She is not using her rescue inhaler. She is currently not on maintenance inhaler. She denies recent loss of weight or appetite changes. She denies recent decline in functional status. She was prescribed with Asmanex HFA 100mcg, 2puffs  BID at the last visit as a treatment for her bronchiectasis. How ever she developed oral thrush and glossitis. She was treated with Oral Nystatin rinses. Her soreness of tongue is improving. She is not using her Albuterol HFA 90mcg/Spray MDI, 2puffs  Q6Hprn. She underwent Flexible diagnostic fiberoptic bronchoscopy with fluoroscopy. During procedure Bronch washings obtained from bilateral tracheobronchial tree including left lingula and bilateral upper lobes. Bronchioalveolar lavage obtained from right middle lobe medial segment. Trans bronchial lung biopsies were performed by using biopsy forceps from right middle lobe medial segment under fluroscopy guidance. Cytology brush specimen was obtained from  right middle lobe medial segment under fluroscopy guidance.  Microbiology brush specimen was obtained from  right middle lobe medial segment under fluroscopy guidance on 10/25/19. She was found to have Haemophilus influenzae infection in bronchoscopy specimen. She was treated with Levaquin for 7days. She underwent chest CT scan on:10/1/19  The above chest  CT was performed at: [facility] [place], [place]. She is currently not using any oxygen supplementation at rest, exercise or during sleep/at night time. No recent hospitalizations or emergency room visits. She had normal Mammogram: Normal in . She had last Colonoscopy performed on: Negative for malignancy- 12/15/15. She denies any hx of connective tissue diseases including Systemic lupus Erythematosus, Rheumatoid arthritis or Sarcoidosis etc.      Social History:  Occupation:  She is current working: No  Type of profession: retired. 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 Years of tobacco smokin. Quit smoking: Yes. Quit year:     History of recreational or IV drug use in the past:NO     History of exposure to coal mines/coal dust: NO  History of exposure to foundry dust/welding: NO  History of exposure to quarry/silica/sandblasting: NO  History of exposure to asbestos/working with breaks/ships: NO  History of exposure to farm dust: NO  History of recent travel to long distances: NO  History of exposure to birds, pigeons, or chickens in the past:NO  Pet animals at home:No      History of pulmonary embolism in the past: No            History of DVT in the past:No                        Review of Systems:   General/Constitutional: she gained 3lbs of weight from the last visit. No fever or chills. HENT: Negative. Eyes: Negative. Upper respiratory tract: Occasional nasal stuffiness with post nasal drip. Lower respiratory tract/ lungs: No cough or sputum production.   Cardiovascular: No palpitations or chest pain.  Gastrointestinal: No nausea or vomiting. Neurological: No focal neurologiacal weakness. Extremities: No edema. Musculoskeletal: No complaints. Genitourinary: No complaints. Hematological: Negative. Psychiatric/Behavioral: Negative. Skin: No itching.       Current Medications:      Past Medical History:   Diagnosis Date    Hypertension        Past Surgical History:   Procedure Laterality Date    BRONCHOSCOPY N/A 10/25/2019    FIBROOPTIC BRONCHOSCOPY W/ BIOPSY UNDER FLUROSCOPY performed by Tracey Barlow MD at [facility]    BRONCHOSCOPY  10/25/2019    BRONCHOSCOPY ALVEOLAR LAVAGE performed by Tracey Barlow MD at [facility]    BRONCHOSCOPY  10/25/2019    BRONCHOSCOPY BRUSHINGS performed by Tracey Barlow MD at [facility]  10/25/2019    BRONCHOSCOPY FLUOROSCOPY performed by Tracey Barlow MD at [facility]    COLONOSCOPY         Allergies   Allergen Reactions    Augmentin [Amoxicillin-Pot Clavulanate]     Seasonal     Bactrim [Sulfamethoxazole-Trimethoprim] Rash       Current Outpatient Medications   Medication Sig Dispense Refill    CEFDINIR PO Take by mouth 2 times daily      PREDNISONE PO Take by mouth Tapering dose      nystatin (MYCOSTATIN) 010939 UNIT/ML suspension Take 5 mLs by mouth 4 times daily 1 Bottle 0    aspirin 81 MG tablet Take 81 mg by mouth daily      amLODIPine (NORVASC) 2.5 MG tablet Take 2.5 mg by mouth daily      Calcium Carb-Cholecalciferol (CALCIUM/VITAMIN D PO) Take 2 tablets by mouth daily      Multiple Vitamins-Minerals (PRESERVISION AREDS PO) Take 2 capsules by mouth daily      GLUCOSAMINE CHONDROITIN COMPLX PO Take 1 tablet by mouth daily      Lactobacillus (PROBIOTIC ACIDOPHILUS PO) Take 1 capsule by mouth daily      Phenylephrine-APAP-guaiFENesin (VICKS SINEX SEVERE PO) Take by mouth as needed      Naproxen Sodium (ALEVE PO) Take by mouth as needed      Acetaminophen (TYLENOL) 325 MG CAPS Take by mouth as needed      ibuprofen (ADVIL;MOTRIN) 200 MG tablet Take 200 mg by mouth every 6 hours as needed for Pain      albuterol sulfate HFA (PROAIR HFA) 108 (90 Base) MCG/ACT inhaler Inhale 2 puffs into the lungs every 6 hours as needed for Wheezing or Shortness of Breath (Patient not taking: Reported on 11/29/2021) 1 Inhaler 11     No current facility-administered medications for this visit. No family history on file. Physical Exam     VITALS:  BP (!) 148/72 (Site: Left Upper Arm, Position: Sitting, Cuff Size: Medium Adult)   Pulse 84   Temp 97.8 °F (36.6 °C)   Ht 5' 5\" (1.651 m)   Wt 149 lb (67.6 kg)   SpO2 97% Comment: room air at rest  BMI 24.79 kg/m²   Nursing note and vitals reviewed. Constitutional: Patient appears moderately built and moderately nourished. No distress. Patient is oriented to person, place, and time. HENT:   Head: Normocephalic and atraumatic. Right Ear: External ear normal.   Left Ear: External ear normal.   Mouth/Throat: Oropharynx is clear and moist.  No oral thrush. Eyes: Conjunctivae are normal. Pupils are equal, round, and reactive to light. No scleral icterus. Neck: Neck supple. No JVD present. No tracheal deviation present. Cardiovascular: Normal rate, regular rhythm, normal heart sounds. No murmur heard. Pulmonary/Chest: Effort normal and breath sounds normal. No stridor. No respiratory distress. No wheezes. No rales. Patient exhibits no tenderness. Abdominal: Soft. Patient exhibits no distension. No tenderness. Musculoskeletal: Normal range of motion. Extremities: Patient exhibits no edema and no tenderness. Lymphadenopathy:  No cervical adenopathy. Neurological: Patient is alert and oriented to person, place, and time. Skin: Skin is warm and dry. Patient is not diaphoretic. Psychiatric: Patient  has a normal mood and affect.  Patient behavior is normal.   .    Diagnostic Data:    Radiological Data: 10/2/2019            Connective tissue work up results:  Date: 10/22/19    KATRIN( Antinuclear antibody):  Negative                     [x]   Anti Centromere Ab screen: Negative                      [x]   Anti RNP (MARCELO-Ab):   Negative                                 [x]   Anti Scleroderma (SCL-70):  Negative                     [x]   Anti Christa-1:   Negative                                                  [x]   Anti Smith antibody:  Negative                                [x]       RA ( Rheumatoid factor):  Negative-11                    [x]   Anti CCP:   Negative                                             [x]   ACE(  level):    Negative-36                                     [x]   ESR ( Sed rate): Negative-14                                    [x]       Hypersensitive Pneumonitis panel:      11/2/2019  6:43 PM - Sim Ann Neither Incoming Lab Results From Soft     Component Collected Lab   [facility]. #1 REFERENCE GROUP TEST 10/22/2019 12:44 PM [facility]   SEE BELOW    Comment:   Test name                     Result Flag Units  RefRange   -------------------------------------------------------------   A. fumigatus #1 Ab, Precipitin                          None Detected             None Detected   A. fumigatus #6 Ab, Precipitin                          None Detected             None Detected   A. pullulans Ab, Precipitin                          None Detected             None Detected   Testing includes antibodies directed at Aureobasidium pullulans,   Aspergillus flavus, Aspergillus fumigatus #1, Aspergillus   fumigatus #2, Aspergillus fumigatus #3, Aspergillus fumigatus #6,   Micropolyspora faeni, Rowland Serum, Saccharomonospora viridis,   Thermoactinomyces candidus, and Thermoactinomyces vulgaris #1.    Rowland Serum Ab, Precipitin                          None Detected             None Detected   M. faeni Ab, Precipitin                          None Detected             None Detected   T. vulgaris #1 Ab, Precipitin                          None Detected             None Detected   A. flavus Ab, Precipitin                          None Detected             None Detected   A. fumigatus #2 Ab, Precipitin                          None Detected             None Detected   A. fumigatus #3 Ab, Precipitin                          None Detected             None Detected   S. viridis Ab, Precipitin                          None Detected             None Detected   T. candidus Ab, Precipitin                          None Detected             None Detected   Allergen, Fungi/Mold, Phoma betae IgE                                  <0.10        kU/L <=0.34   Allergen, Food, Beef IgE       <0.10        kU/L <=0.34   Allergen, Food, Pork IgE       <0.10        kU/L <=0.34   Allergen, Animal, Feather Mix IgE                               Negative        kU/L Negative   Allergen, Interp, Immunocap Score IgE                               See Note   REFERENCE INTERVAL: Allergen, Interpretation    Less than 0.10 kU/L. ... Mickey Akbar Mickey Akbar Class 0... Mickey Akbar Mickey Akbar No significant level detected    0.10-0.34 kU/L. ......... Mickey Akbar Class 0/1. Mickey Akbar Mickey Akbar Clinical relevance   undetermined    0.35-0.70 kU/L. ......... Mickey Akbar Class 1... Mickey Akbar Mickey Akbar Low    0.71-3.50 kU/L. ......... Mickey Akbar Class 2. .. Mickey Akbar Mickey Akbar Moderate    3.51-17.50 kU/L. ........ Mickey Akbar Class 3... Mickey Akbar Mickey Akbar High    17.51-50.00 kU/L. ....... Mickey Akbar Class 4. .. Mickey Akbar Mickey Akbar Very High    50..00 kU/L. ...... Mickey Akbar Class 5. .. Mickey Akbar Mickey Akbar Very High    Greater than 100.00kU/L. Mickey Akbar Class 6. .. Mickey Akbar Mickey Akbar Very High   Allergen results of 0.10-0.34 kU/L are intended for specialist use   as the clinical relevance is undetermined. Even though increasing   ranges are reflective of increasing concentrations of   allergen-specific IgE, these concentrations may not correlate with   the degree of clinical response or skin testing results when   challenged with a specific allergen.  The correlation of allergy   laboratory results with clinical history and in vivo reactivity to   specific allergens is essential. A negative test may not rule out   clinical allergy or even anaphylaxis. Performed by [place] [id number], [address] [address] [phone]   www. Chantell Pelaez MD, Lab. Director        Bronchoscopy results:  Hospital performed: [facility]. Date of procedure: 10/25/19  Procedure: Flexible diagnostic fiberoptic bronchoscopy with fluoroscopy. During procedure Bronch washings obtained from bilateral tracheobronchial tree including left lingula and bilateral upper lobes. Bronchioalveolar lavage obtained from right middle lobe medial segment. Trans bronchial lung biopsies were performed by using biopsy forceps from right middle lobe medial segment under fluroscopy guidance. Cytology brush specimen was obtained from  right middle lobe medial segment under fluroscopy guidance. Microbiology brush specimen was obtained from  right middle lobe medial segment under fluroscopy guidence. BAL ( Broncho alveolar lavage):    Acid fast bacilli:  Negative                          [x]   Fungal cultures:  Negative                         [x]   Routine cultures: Haemophilus influenzae     [x]   Viral cultures:      Negative. [x]   Legionella cultures:Negative         [x]   Pneumocystis Jerovicii stain: Negative. [x]   Cytology:  Negative.                                    []     BAL Differential:  10/25/2019 12:32 PM - Seth, [facility] Incoming Lab Results From Soft     Component Value Ref Range & Units Status Collected Lab   BAL Color PINK   Final 10/25/2019 11:32 AM [facility]   BAL Character CLOUDY/MILKY   Final 10/25/2019 11:32 AM [facility]   BAL Collection Site BAL   Final 10/25/2019 11:32 AM [facility]   Total Volume Received BAL 40  ml Final 10/25/2019 11:32 AM [facility]   Total Nucleated Cells   /cumm Final 10/25/2019 11:32 AM [facility]   RBC BAL 5170  /cumm Final 10/25/2019 11:32 AM [facility]   Macrophage/Monocyte BAL 4Low   86 - 100 % Final 10/25/2019 11:32 AM [facility]   Lymphocytes, BAL 6Low   10 - 15 % Final 10/25/2019 11:32 AM [facility]   Segmented Neutrophils, BAL 85High   0 - 3 % Final 10/25/2019 11:32 AM [facility]   intracellular bacteria present. MD SB   Eosinophils BAL 1  0 - 1 % Final 10/25/2019 11:32 AM [facility]   Ciliated/Epithelial Cells BAL 4  0 - 5 % Final 10/25/2019 11:32 AM [facility]   Pathologist Review EKPRASANNA   Final 10/25/2019 11:32 AM [facility]         Bronch washings:     Acid fast bacilli:  Negative                          [x]   Fungal cultures:  Negative                         [x]   Routine cultures:  Haemophilus influenzae. [x]   Viral cultures:      Negative. [x]       Transbronchial biopsy:  [facility]                                    RECV: 10/25/2019  [address]                                    RPTD: 10/26/2019  UMAIR ROBBINS II.[facility], [address]   Clinical Information: ABNORMAL CT    FINAL DIAGNOSIS:  Lung, right middle lobe, medial segment biopsy:   Negative for malignancy. No intact pulmonary alveolar tissue for evaluation. See microscopic. Transbronchial brushes: Haemophilus influenzae. No fungus. HRCT of chest: Not done- please see following addendum. Addendum done on 3/26/20 at 10:28 AM EDT:  I spoke with Dr. Mile Villa radiologist as a part of peer to peer discussion to get above HRCT to get approved. How ever he told me that the CT chest ordered above is too soon. It should be done after September, 2020.     Please reschedule her HRCT of chest to be done after September, 2020. Please arrange for follow up 1 week after above HRCT of chest.   Will also order full PFTS to be done at least 1 week before clinic visit.     HRCT of chest: 10/6/2020        The latest/above CT chest (Left) was compared with her old CT chest ( Right).     PFTS: 10/6/2020          CT chest with out contrast:        Modified barium swallow test: Performed on 20 October 2020                Assessment:  -Abnormal CT chest dated 10/1/19 reported as multilobar bronchopneumonia along with Centrilobular nodules in both upper lobes-S/p Bronchoscopy on 25 October 2019 and found to have Haemophilus influenzae infection. She was treated with Levaquin in the past for 7days. She is clinically remain asymptomatic with good functional status. She also refused to go for any further testing for her worsening of HRCT findings due to her asymptomatic nature. Had a recent CT scan of chest dated 7 October 2021-slightly improved. Patient also remain asymptomatic.  -Chronic maxillary sinusitis. She is currently on treatment with antibiotic therapy I.e Cefdnir by Dr. Adilson Brice MD.  She is waiting for sinus surgery 28 March 2022 by Dr. Adilson Brice MD  -Bronchiectasis - under control. She refused to use any maintenance inhaled steroid due to development of sore throat.  -Hx of tobacco smoking for 4years. She quit smoking several years back. Recommendations/Plan:  -Continue patient on Albuterol HFA 90mcg/Spray MDI, 2puffs  Q6Hprn. She is currently not using any inhalers.  -She was advised to keep her scheduled follow-up appointment with Lenore Nguyen and Throat specialist Dr. Jeevan Patel Specialist at [facility] for management of her chronic sinusitis. She is waiting for sinus surgery 28 March 2022 by Dr. Adilson Brice MD  -Patient educated to update his pneumococcal vaccine with family physician and take influenza vaccine in coming season with out fail.   -Yue Stephens was advised to make early appointment with my clinic if she develops any constitutional symptoms including loss of weight, poor appetite or hemoptysis.  She verbalizes under standing.  - Schedule patient for CT scan of chest without IV contrast in 1year to follow abnormal CT Chest with hx of multilobar bronchopneumonia along with Centrilobular nodules in both lungs.   -She was advised to continue to practice Anti-reflux measures such as raising the head of the bed, avoiding tight clothing or belts, avoiding eating late at night and not lying down shortly after mealtime and achieving weight loss are discussed. Avoid ASA, NSAID's, caffeine, peppermints, alcohol and tobacco.   -Inge Saravia was advised to make early appointment with my clinic if she develops any constitutional symptoms including loss of weight, poor appetite or hemoptysis. She verbalizes under standing.  - Schedule patient for follow up with my clinic in 1year with recommended test I.e CT chest with out contrast. Patient advised to make early appointment if needed. - Patient educated about my impression and plan. Patient verbalizes understanding.          -I personally reviewed and updated the Past medical hx, Past surgical hx,Social hx, Family hx, Medications, Allergies in the discrete data section of the patient chart. I also reviewed pertaining labs and Pulmonary medicine,Sleep medicine related, Pathological, Microbiological and Radiological investigations.

## 2021-11-29 ENCOUNTER — OFFICE VISIT (OUTPATIENT)
Dept: PULMONOLOGY | Age: 81
End: 2021-11-29
Payer: MEDICARE

## 2021-11-29 ENCOUNTER — HOSPITAL ENCOUNTER (OUTPATIENT)
Dept: GENERAL RADIOLOGY | Age: 81
Discharge: HOME | End: 2021-11-29

## 2021-11-29 ENCOUNTER — HOSPITAL ENCOUNTER (OUTPATIENT)
Dept: CT IMAGING | Age: 81
Discharge: HOME | End: 2021-11-29

## 2021-11-29 VITALS
WEIGHT: 149 LBS | TEMPERATURE: 97.8 F | OXYGEN SATURATION: 97 % | BODY MASS INDEX: 24.83 KG/M2 | HEART RATE: 84 BPM | DIASTOLIC BLOOD PRESSURE: 72 MMHG | HEIGHT: 65 IN | SYSTOLIC BLOOD PRESSURE: 148 MMHG

## 2021-11-29 DIAGNOSIS — J47.9 BRONCHIECTASIS WITHOUT COMPLICATION (HCC): ICD-10-CM

## 2021-11-29 DIAGNOSIS — Z00.6 ENCOUNTER FOR EXAMINATION FOR NORMAL COMPARISON AND CONTROL IN CLINICAL RESEARCH PROGRAM: ICD-10-CM

## 2021-11-29 DIAGNOSIS — R93.89 ABNORMAL CT OF THE CHEST: Primary | ICD-10-CM

## 2021-11-29 PROCEDURE — 99214 OFFICE O/P EST MOD 30 MIN: CPT | Performed by: INTERNAL MEDICINE

## 2021-11-29 NOTE — PATIENT INSTRUCTIONS
Recommendations/Plan:  -Continue patient on Albuterol HFA 90mcg/Spray MDI, 2puffs  Q6Hprn. She is currently not using any inhalers.  -She was advised to keep her scheduled follow-up appointment with Phil Galeana and Throat specialist Dr. Nanette Mayes Specialist at [facility] for management of her chronic sinusitis. She is waiting for sinus surgery 28 March 2022 by Dr. Gema Greene MD  -Patient educated to update his pneumococcal vaccine with family physician and take influenza vaccine in coming season with out fail.   -Inge Saravia was advised to make early appointment with my clinic if she develops any constitutional symptoms including loss of weight, poor appetite or hemoptysis. She verbalizes under standing.  - Schedule patient for CT scan of chest without IV contrast in 1year to follow abnormal CT Chest with hx of multilobar bronchopneumonia along with Centrilobular nodules in both lungs.   -She was advised to continue to practice Anti-reflux measures such as raising the head of the bed, avoiding tight clothing or belts, avoiding eating late at night and not lying down shortly after mealtime and achieving weight loss are discussed. Avoid ASA, NSAID's, caffeine, peppermints, alcohol and tobacco.   -Inge Saravia was advised to make early appointment with my clinic if she develops any constitutional symptoms including loss of weight, poor appetite or hemoptysis. She verbalizes under standing.  - Schedule patient for follow up with my clinic in 1year with recommended test I.e CT chest with out contrast. Patient advised to make early appointment if needed. - Patient educated about my impression and plan. Patient verbalizes understanding.

## 2022-11-29 ENCOUNTER — TELEPHONE (OUTPATIENT)
Dept: PULMONOLOGY | Age: 82
End: 2022-11-29

## 2023-01-25 DIAGNOSIS — R93.89 ABNORMAL CT OF THE CHEST: ICD-10-CM

## 2023-01-25 DIAGNOSIS — J47.9 BRONCHIECTASIS WITHOUT COMPLICATION (HCC): ICD-10-CM

## 2023-01-25 DIAGNOSIS — J18.9 MULTIFOCAL PNEUMONIA: ICD-10-CM

## 2023-01-30 NOTE — PROGRESS NOTES
[facility]  Pulmonary medicine clinic follow up note. Patient: Jame Hall  : 1940      Chief complaint/[place]:     Lung Nodule Screening     [] Qualifies    [x] Does not qualify   [] Declined    [] Completed    Chief complaint and [place]:  Jame Hall is a 81 y. o.oldfemale came for follow up regarding her abnormal CT of chest and bronchiectasis after having a CT of chest.  She underwent sinus surgery 2022 by Dr. Josue Malone MD. her cough got significantly improved. She is currently waiting for a cholecystectomy surgery by Dr. Paulina Burns MD at [facility] AT THE [facility].    On today's questioning:  She denies cough or expectoration. She denies hemoptysis. She denies fever or chills. She denies recent hospitalizations or emergency room visits. She is not using her rescue inhaler. She is currently not on maintenance inhaler. She denies recent loss of weight or appetite changes. She denies recent decline in functional status. She was prescribed with Asmanex HFA 100mcg, 2puffs  BID at the last visit as a treatment for her bronchiectasis. How ever she developed oral thrush and glossitis. She was treated with Oral Nystatin rinses. Her soreness of tongue is improving. She is not using her Albuterol HFA 90mcg/Spray MDI, 2puffs  Q6Hprn. She underwent Flexible diagnostic fiberoptic bronchoscopy with fluoroscopy. During procedure Bronch washings obtained from bilateral tracheobronchial tree including left lingula and bilateral upper lobes. Bronchioalveolar lavage obtained from right middle lobe medial segment. Trans bronchial lung biopsies were performed by using biopsy forceps from right middle lobe medial segment under fluroscopy guidance. Cytology brush specimen was obtained from  right middle lobe medial segment under fluroscopy guidance.  Microbiology brush specimen was obtained from  right middle lobe medial segment under fluroscopy guidance on 10/25/19. She was found to have Haemophilus influenzae infection in bronchoscopy specimen. She was treated with Levaquin for 7days. She underwent chest CT scan on:10/1/19  The above chest  CT was performed at: [facility] [place], [place]. She is currently not using any oxygen supplementation at rest, exercise or during sleep/at night time. No recent hospitalizations or emergency room visits. She had normal Mammogram: Normal in . She had last Colonoscopy performed on: Negative for malignancy- 12/15/15. She denies any hx of connective tissue diseases including Systemic lupus Erythematosus, Rheumatoid arthritis or Sarcoidosis etc.      Social History:  Occupation:  She is current working: No  Type of profession: retired. 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 Years of tobacco smokin. Quit smoking: Yes. Quit year:     History of recreational or IV drug use in the past:NO     History of exposure to coal mines/coal dust: NO  History of exposure to foundry dust/welding: NO  History of exposure to quarry/silica/sandblasting: NO  History of exposure to asbestos/working with breaks/ships: NO  History of exposure to farm dust: NO  History of recent travel to long distances: NO  History of exposure to birds, pigeons, or chickens in the past:NO  Pet animals at home:No      History of pulmonary embolism in the past: No            History of DVT in the past:No                        Review of Systems:   General/Constitutional: she lost 12lbs of weight from the last visit. No fever or chills. HENT: Negative. Eyes: Negative. Upper respiratory tract: No nasal stuffiness or post nasal drip. Lower respiratory tract/ lungs: No cough or sputum production.   Cardiovascular: No palpitations or chest pain.  Gastrointestinal: No nausea or vomiting. Neurological: No focal neurologiacal weakness. Extremities: No edema. Musculoskeletal: No complaints. Genitourinary: No complaints. Hematological: Negative. Psychiatric/Behavioral: Negative. Skin: No itching.       Current Medications:      Past Medical History:   Diagnosis Date    Hypertension        Past Surgical History:   Procedure Laterality Date    BRONCHOSCOPY N/A 10/25/2019    FIBROOPTIC BRONCHOSCOPY W/ BIOPSY UNDER FLUROSCOPY performed by Ada Jarrett MD at [facility]    BRONCHOSCOPY  10/25/2019    BRONCHOSCOPY ALVEOLAR LAVAGE performed by Ada Jarrett MD at [facility]    BRONCHOSCOPY  10/25/2019    BRONCHOSCOPY BRUSHINGS performed by dAa Jarrett MD at [facility]    BRONCHOSCOPY  10/25/2019    BRONCHOSCOPY FLUOROSCOPY performed by Ada Jarrett MD at [facility]    COLONOSCOPY         Allergies   Allergen Reactions    Augmentin [Amoxicillin-Pot Clavulanate]     Seasonal     Bactrim [Sulfamethoxazole-Trimethoprim] Rash       Current Outpatient Medications   Medication Sig Dispense Refill    Fexofenadine HCl (ALLEGRA ALLERGY PO) Take by mouth      ipratropium (ATROVENT) 0.06 % nasal spray 2 sprays by Each Nostril route 4 times daily      aspirin 81 MG tablet Take 81 mg by mouth daily      amLODIPine (NORVASC) 2.5 MG tablet Take 10 mg by mouth daily      Calcium Carb-Cholecalciferol (CALCIUM/VITAMIN D PO) Take 2 tablets by mouth daily      Multiple Vitamins-Minerals (PRESERVISION AREDS PO) Take 2 capsules by mouth daily      GLUCOSAMINE CHONDROITIN COMPLX PO Take 1 tablet by mouth daily      Lactobacillus (PROBIOTIC ACIDOPHILUS PO) Take 1 capsule by mouth daily      Acetaminophen 325 MG CAPS Take by mouth as needed      ibuprofen (ADVIL;MOTRIN) 200 MG tablet Take 200 mg by mouth every 6 hours as needed for Pain      Phenylephrine-APAP-guaiFENesin (VICKS SINEX SEVERE PO) Take by mouth as needed (Patient not taking: Reported on 2/23/2023)       No current facility-administered medications for this visit. No family history on file. Physical Exam     VITALS:  /66 (Site: Left Upper Arm, Position: Sitting, Cuff Size: Medium Adult)   Pulse 78   Temp 97.8 °F (36.6 °C)   Ht 5' 5.5\" (1.664 m)   Wt 137 lb (62.1 kg)   SpO2 98% Comment: room air at rest  BMI 22.45 kg/m²   Nursing note and vitals reviewed. Constitutional: Patient appears moderately built and moderately nourished. No distress. Patient is oriented to person, place, and time. HENT:   Head: Normocephalic and atraumatic. Right Ear: External ear normal.   Left Ear: External ear normal.   Mouth/Throat: Oropharynx is clear and moist.  No oral thrush. Eyes: Conjunctivae are normal. Pupils are equal, round, and reactive to light. No scleral icterus. Neck: Neck supple. No JVD present. No tracheal deviation present. Cardiovascular: Normal rate, regular rhythm, normal heart sounds. No murmur heard. Pulmonary/Chest: Effort normal and breath sounds normal. No stridor. No respiratory distress. No wheezes. No rales. Patient exhibits no tenderness. Abdominal: Soft. Patient exhibits no distension. No tenderness. Musculoskeletal: Normal range of motion. Extremities: Patient exhibits no edema and no tenderness. Lymphadenopathy:  No cervical adenopathy. Neurological: Patient is alert and oriented to person, place, and time. Skin: Skin is warm and dry. Patient is not diaphoretic. Psychiatric: Patient  has a normal mood and affect.  Patient behavior is normal.   .    Diagnostic Data:    Radiological Data: 10/2/2019            Connective tissue work up results:  Date: 10/22/19    KATRIN( Antinuclear antibody):  Negative                     [x]   Anti Centromere Ab screen: Negative                      [x]   Anti RNP (MARCELO-Ab):   Negative                                 [x]   Anti Scleroderma (SCL-70):  Negative                     [x] Anti Christa-1:   Negative                                                  [x]   Anti Smith antibody:  Negative                                [x]       RA ( Rheumatoid factor):  Negative-11                    [x]   Anti CCP:   Negative                                             [x]   ACE(  level):    Negative-36                                     [x]   ESR ( Sed rate): Negative-14                                    [x]       Hypersensitive Pneumonitis panel:      11/2/2019  6:43 PM - Nancy Ann Incoming Lab Results From Soft     Component Collected Lab   [facility]. #1 REFERENCE GROUP TEST 10/22/2019 12:44 PM [facility]   SEE BELOW    Comment:   Test name                     Result Flag Units  RefRange   -------------------------------------------------------------   A. fumigatus #1 Ab, Precipitin                          None Detected             None Detected   A. fumigatus #6 Ab, Precipitin                          None Detected             None Detected   A. pullulans Ab, Precipitin                          None Detected             None Detected   Testing includes antibodies directed at Aureobasidium pullulans,   Aspergillus flavus, Aspergillus fumigatus #1, Aspergillus   fumigatus #2, Aspergillus fumigatus #3, Aspergillus fumigatus #6,   Micropolyspora faeni, Hollis Serum, Saccharomonospora viridis,   Thermoactinomyces candidus, and Thermoactinomyces vulgaris #1.    Hollis Serum Ab, Precipitin                          None Detected             None Detected   M. faeni Ab, Precipitin                          None Detected             None Detected   T. vulgaris #1 Ab, Precipitin                          None Detected             None Detected   A. flavus Ab, Precipitin                          None Detected             None Detected   A. fumigatus #2 Ab, Precipitin                          None Detected             None Detected   A. fumigatus #3 Ab, Precipitin                          None Detected             None Detected   S. viridis Ab, Precipitin                          None Detected             None Detected   T. candidus Ab, Precipitin                          None Detected             None Detected   Allergen, Fungi/Mold, Phoma betae IgE                                  <0.10        kU/L <=0.34   Allergen, Food, Beef IgE       <0.10        kU/L <=0.34   Allergen, Food, Pork IgE       <0.10        kU/L <=0.34   Allergen, Animal, Feather Mix IgE                               Negative        kU/L Negative   Allergen, Interp, Immunocap Score IgE                               See Note   REFERENCE INTERVAL: Allergen, Interpretation    Less than 0.10 kU/L. ... Les Pimple Les Pimple Class 0... Les Pimple Les Pimple No significant level detected    0.10-0.34 kU/L. ......... Les Pimple Class 0/1. Les Pimple Les Pimple Clinical relevance   undetermined    0.35-0.70 kU/L. ......... Les Pimple Class 1... Les Pimple Les Pimple Low    0.71-3.50 kU/L. ......... Les Pimple Class 2. .. Les Pimple Les Pimple Moderate    3.51-17.50 kU/L. ........ Les Pimple Class 3... Les Pimple Les Pimple High    17.51-50.00 kU/L. ....... Les Pimple Class 4. .. Les Pimple Les Pimple Very High    50..00 kU/L. ...... Les Pimple Class 5. .. Les Pimple Les Pimple Very High    Greater than 100.00kU/L. Les Pimple Class 6. .. Les Pimple Les Pimple Very High   Allergen results of 0.10-0.34 kU/L are intended for specialist use   as the clinical relevance is undetermined. Even though increasing   ranges are reflective of increasing concentrations of   allergen-specific IgE, these concentrations may not correlate with   the degree of clinical response or skin testing results when   challenged with a specific allergen. The correlation of allergy   laboratory results with clinical history and in vivo reactivity to   specific allergens is essential. A negative test may not rule out   clinical allergy or even anaphylaxis. Performed by [facility] , [address] [address] [facility] [phone]   www. Diannia Dakin, MD, Lab. Director        Bronchoscopy results:  Hospital performed: [facility]. Date of procedure: 10/25/19  Procedure: Flexible diagnostic fiberoptic bronchoscopy with fluoroscopy.  During procedure Bronch washings obtained from bilateral tracheobronchial tree including left lingula and bilateral upper lobes. Bronchioalveolar lavage obtained from right middle lobe medial segment. Trans bronchial lung biopsies were performed by using biopsy forceps from right middle lobe medial segment under fluroscopy guidance. Cytology brush specimen was obtained from  right middle lobe medial segment under fluroscopy guidance. Microbiology brush specimen was obtained from  right middle lobe medial segment under fluroscopy guidence. BAL ( Broncho alveolar lavage):    Acid fast bacilli:  Negative                          [x]   Fungal cultures:  Negative                         [x]   Routine cultures: Haemophilus influenzae     [x]   Viral cultures:      Negative. [x]   Legionella cultures:Negative         [x]   Pneumocystis Jerovicii stain: Negative. [x]   Cytology:  Negative. []     BAL Differential:  10/25/2019 12:32 PM - Seth, [facility] Incoming Lab Results From Soft     Component Value Ref Range & Units Status Collected Lab   BAL Color PINK   Final 10/25/2019 11:32 AM [facility] Lab   BAL Character CLOUDY/MILKY   Final 10/25/2019 11:32 AM [facility] Lab   BAL Collection Site BAL   Final 10/25/2019 11:32 AM [facility] Lab   Total Volume Received BAL 40  ml Final 10/25/2019 11:32 AM [facility] Lab   Total Nucleated Cells   /cumm Final 10/25/2019 11:32 AM [facility] Lab   RBC BAL 5170  /cumm Final 10/25/2019 11:32 AM [facility] Lab   Macrophage/Monocyte BAL 4Low   86 - 100 % Final 10/25/2019 11:32 AM [facility] Lab   Lymphocytes, BAL 6Low   10 - 15 % Final 10/25/2019 11:32 AM [facility] Lab   Segmented Neutrophils, BAL 85High   0 - 3 % Final 10/25/2019 11:32 AM [facility] Lab   intracellular bacteria present.  MD SB   Eosinophils BAL 1  0 - 1 % Final 10/25/2019 11:32 AM  - [facility]   Ciliated/Epithelial Cells BAL 4  0 - 5 % Final 10/25/2019 11:32 AM [facility] Lab   Pathologist Review EKM   Final 10/25/2019 11:32 AM [facility]         Bronch washings:     Acid fast bacilli:  Negative                          [x]   Fungal cultures:  Negative                         [x]   Routine cultures:  Haemophilus influenzae. [x]   Viral cultures:      Negative. [x]       Transbronchial biopsy:  [facility]                                    RECV: 10/25/2019  Ceferino0 WCarmenza Josesito Ray                                    RPTD: 10/26/2019  UMAIR ROBBINS II.ALEX, [address]   Clinical Information: ABNORMAL CT    FINAL DIAGNOSIS:  Lung, right middle lobe, medial segment biopsy:   Negative for malignancy. No intact pulmonary alveolar tissue for evaluation. See microscopic. Transbronchial brushes: Haemophilus influenzae. No fungus. HRCT of chest: Not done- please see following addendum. Addendum done on 3/26/20 at 10:28 AM EDT:  I spoke with Dr. Nasra Grewal radiologist as a part of peer to peer discussion to get above HRCT to get approved. How ever he told me that the CT chest ordered above is too soon. It should be done after September, 2020. Please reschedule her HRCT of chest to be done after September, 2020. Please arrange for follow up 1 week after above HRCT of chest.   Will also order full PFTS to be done at least 1 week before clinic visit. HRCT of chest: 10/6/2020    The latest/above CT chest (Left) was compared with her old CT chest ( Right). PFTS: 10/6/2020          CT chest with out contrast: Performed on 7 October 2021        Modified barium swallow test: Performed on 20 October 2020              CT chest with out contrast: Performed on 24 January 2023        The above radiological study films were reviewed by me.       Assessment:  -Abnormal CT chest dated 10/1/19 reported as multilobar bronchopneumonia along with Centrilobular nodules in both upper lobes-S/p Bronchoscopy on 25 October 2019 and found to have Haemophilus influenzae infection. She was treated with Levaquin in the past for 7days. She is clinically remain asymptomatic with good functional status. She also refused to go for any further testing for her worsening of HRCT findings due to her asymptomatic nature. Her CT scan of chest without IV contrast performed on 24 January 2023 was compared to the previous CT scans dated 7 October 2021 on 6 October 2020-stable compared to her CT scan performed on 7 October 2021. Patient also remain asymptomatic.  -Hx of Chronic maxillary sinusitis. She underwent sinus surgery on 28 March 2022 by Dr. Roxanna Lozada MD.  -Bronchiectasis - under control. She refused to use any maintenance inhaled steroid due to development of sore throat.  -Hx of tobacco smoking for 4years. She quit smoking several years back.  -She is currently waiting for gallbladder surgery by Dr. Lily Plaza MD at [facility].    Recommendations/Plan:  -Continue patient on Albuterol HFA 90mcg/Spray MDI, 2puffs  Q6Hprn. She is currently not using any inhalers.  -She was advised to keep her scheduled follow-up appointment with Sandee Madsen and Throat specialist Dr. Lana Andrew Specialist at [address] for management of her chronic sinusitis.  -Patient educated to update his pneumococcal vaccine with family physician and take influenza vaccine in coming season with out fail.   -Schedule patient for CT scan of chest without IV contrast in 2years to follow abnormal CT Chest with hx of multilobar bronchopneumonia along with Centrilobular nodules in both lungs. -Roberto Dneson was advised to make early appointment with my clinic if she develops any constitutional symptoms including loss of weight, poor appetite or hemoptysis.  She verbalizes under standing.  -She was advised to continue to practice Anti-reflux measures such as raising the head of the bed, avoiding tight clothing or belts, avoiding eating late at night and not lying down shortly after mealtime and achieving weight loss are discussed. Avoid ASA, NSAID's, caffeine, peppermints, alcohol and tobacco.   -Roberto Denson was advised to make early appointment with my clinic if she develops any constitutional symptoms including loss of weight, poor appetite or hemoptysis. She verbalizes under standing.  - Schedule patient for follow up with my clinic in 2years with recommended test I.e CT chest with out contrast and full PFTS before clinic visit. Patient advised to make early appointment if needed. - Patient educated about my impression and plan. Patient verbalizes understanding.          -I personally reviewed and updated the Past medical hx, Past surgical hx,Social hx, Family hx, Medications, Allergies in the discrete data section of the patient chart. I also reviewed pertaining labs and Pulmonary medicine,Sleep medicine related, Pathological, Microbiological and Radiological investigations.

## 2023-02-23 ENCOUNTER — OFFICE VISIT (OUTPATIENT)
Dept: PULMONOLOGY | Age: 83
End: 2023-02-23
Payer: MEDICARE

## 2023-02-23 VITALS
SYSTOLIC BLOOD PRESSURE: 124 MMHG | HEIGHT: 66 IN | HEART RATE: 78 BPM | OXYGEN SATURATION: 98 % | WEIGHT: 137 LBS | DIASTOLIC BLOOD PRESSURE: 66 MMHG | BODY MASS INDEX: 22.02 KG/M2 | TEMPERATURE: 97.8 F

## 2023-02-23 DIAGNOSIS — R93.89 ABNORMAL CT OF THE CHEST: Primary | ICD-10-CM

## 2023-02-23 DIAGNOSIS — J18.9 MULTIFOCAL PNEUMONIA: ICD-10-CM

## 2023-02-23 DIAGNOSIS — J47.9 BRONCHIECTASIS WITHOUT COMPLICATION (HCC): ICD-10-CM

## 2023-02-23 PROCEDURE — 99214 OFFICE O/P EST MOD 30 MIN: CPT | Performed by: INTERNAL MEDICINE

## 2023-02-23 PROCEDURE — 1123F ACP DISCUSS/DSCN MKR DOCD: CPT | Performed by: INTERNAL MEDICINE

## 2023-02-23 RX ORDER — IPRATROPIUM BROMIDE 42 UG/1
2 SPRAY, METERED NASAL 4 TIMES DAILY
COMMUNITY

## 2023-02-23 NOTE — PATIENT INSTRUCTIONS
Recommendations/Plan:  -Continue patient on Albuterol HFA 90mcg/Spray MDI, 2puffs  Q6Hprn. She is currently not using any inhalers.  -She was advised to keep her scheduled follow-up appointment with Sheridan Height and Throat specialist Dr. Cortney Baez Specialist at [address] for management of her chronic sinusitis.  -Patient educated to update his pneumococcal vaccine with family physician and take influenza vaccine in coming season with out fail.   -Schedule patient for CT scan of chest without IV contrast in 2years to follow abnormal CT Chest with hx of multilobar bronchopneumonia along with Centrilobular nodules in both lungs. -Beth Juarez was advised to make early appointment with my clinic if she develops any constitutional symptoms including loss of weight, poor appetite or hemoptysis. She verbalizes under standing.  -She was advised to continue to practice Anti-reflux measures such as raising the head of the bed, avoiding tight clothing or belts, avoiding eating late at night and not lying down shortly after mealtime and achieving weight loss are discussed. Avoid ASA, NSAID's, caffeine, peppermints, alcohol and tobacco.   -Beth Juarez was advised to make early appointment with my clinic if she develops any constitutional symptoms including loss of weight, poor appetite or hemoptysis. She verbalizes under standing.  - Schedule patient for follow up with my clinic in 2years with recommended test I.e CT chest with out contrast and full PFTS before clinic visit. Patient advised to make early appointment if needed. - Patient educated about my impression and plan. Patient verbalizes understanding.

## 2024-03-26 ENCOUNTER — TELEPHONE (OUTPATIENT)
Dept: PULMONOLOGY | Age: 84
End: 2024-03-26

## 2024-03-26 NOTE — TELEPHONE ENCOUNTER
This patient was seen February of 2023 to follow up from her Ct Chest WO Contrast. She is rescheduled for a 2 year follow up with a CT Chest WO Contrast. Dr Quinn's office called to verify is she supposed to follow up in 2 years or should it have been a 1 year follow up? I told her I would clarify with you why 2 years instead of 1. Thank you.

## 2024-03-27 NOTE — TELEPHONE ENCOUNTER
I called and spoke with [facility]el[facility] Chappell office and informed them of Dr Carrizales message. They are going to reach out to the pt and if she would like to be seen and get a CT they will call our office back.

## 2024-10-05 NOTE — PROGRESS NOTES
approved by her cardiologist Dr. Mariela MD.  -Schedule patient for Bronchoscopy for Broncho alveolar lavage (BAL) and washings with out fluoroscopy in endoscopy suite at [facility] under anesthesia from anaesthesilogy department to further evaluate the etiology of abnormal CT scan of chest with worsening of bilateral pneumonia due to concern for atypical mycobacterial infection including TORIE.  -Erika MIMS Sujatha educated and informed about bronchoscopy procedure,method, complicantions of procedure including but not limited to development of pneumothorax with requirement of chest tube placement. She agreed for the procedure and verbalizes understanding.  -Continue patient on Albuterol HFA 90mcg/Spray MDI, 2puffs  Q6Hprn.  She is currently not using any inhalers.  -Will hold off on Arnuity Ellipta 100mcg/actuation DPI 1puff daily in am due to suspicion for atypical mycobacterial infection until her bronchoscopy results were available. Erika PRASANNA Sujatha educated and demonstrated in my office how to use Arnuity Ellipta . She   verbalizes understanding. She was detailed about mechanism of action of drug along with associated side effects. She agreed to take the risk and medication. She verbalizes understanding.  -She was advised to keep her scheduled follow-up appointment with Ear, Nose and Throat specialist Dr. Jie MD/ENT Specialist at [place], [place] for management of her chronic sinusitis.  -Patient educated to update his pneumococcal vaccine with family physician and take influenza vaccine in coming season with out fail.   -She is going to discuss with her family physician and Dr. 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  -Erika PRASANNA Sujatha was advised to make early appointment with my clinic if she develops any constitutional symptoms including loss of weight, poor appetite or

## 2024-10-08 ENCOUNTER — HOSPITAL ENCOUNTER (OUTPATIENT)
Dept: CT IMAGING | Age: 84
Discharge: HOME | End: 2024-10-08
Attending: RADIOLOGY

## 2024-10-08 ENCOUNTER — HOSPITAL ENCOUNTER (OUTPATIENT)
Dept: GENERAL RADIOLOGY | Age: 84
Discharge: HOME | End: 2024-10-08
Attending: RADIOLOGY

## 2024-10-08 DIAGNOSIS — Z00.6 EXAMINATION FOR NORMAL COMPARISON FOR CLINICAL RESEARCH: ICD-10-CM

## 2024-10-10 ENCOUNTER — OFFICE VISIT (OUTPATIENT)
Dept: PULMONOLOGY | Age: 84
End: 2024-10-10

## 2024-10-10 VITALS
HEIGHT: 65 IN | SYSTOLIC BLOOD PRESSURE: 128 MMHG | DIASTOLIC BLOOD PRESSURE: 80 MMHG | WEIGHT: 133.4 LBS | OXYGEN SATURATION: 97 % | TEMPERATURE: 98.1 F | HEART RATE: 86 BPM | BODY MASS INDEX: 22.23 KG/M2

## 2024-10-10 DIAGNOSIS — J47.9 BRONCHIECTASIS WITHOUT COMPLICATION (HCC): Primary | ICD-10-CM

## 2024-10-10 DIAGNOSIS — J18.9 ATYPICAL PNEUMONIA: ICD-10-CM

## 2024-10-10 DIAGNOSIS — R93.89 ABNORMAL CT OF THE CHEST: ICD-10-CM

## 2024-10-10 RX ORDER — DILTIAZEM HCL 60 MG
60 TABLET ORAL
COMMUNITY

## 2024-10-10 RX ORDER — ALBUTEROL SULFATE 90 UG/1
2 INHALANT RESPIRATORY (INHALATION) 4 TIMES DAILY PRN
Qty: 18 G | Refills: 11 | Status: SHIPPED | OUTPATIENT
Start: 2024-10-10 | End: 2025-10-10

## 2024-10-15 ENCOUNTER — PREP FOR PROCEDURE (OUTPATIENT)
Dept: PULMONOLOGY | Age: 84
End: 2024-10-15

## 2024-10-18 ENCOUNTER — TELEPHONE (OUTPATIENT)
Dept: PULMONOLOGY | Age: 84
End: 2024-10-18

## 2024-10-18 NOTE — TELEPHONE ENCOUNTER
Patient was curious if she could move her bronch to earlier time of day, I advised her there were no earlier times and she was blocked into Dr. Carrizales schedule already. She was provided info on where to arrive for procedure and had no further questions.

## 2024-10-25 ENCOUNTER — APPOINTMENT (OUTPATIENT)
Dept: ENDOSCOPY | Age: 84
End: 2024-10-25
Attending: INTERNAL MEDICINE
Payer: MEDICARE

## 2024-10-25 ENCOUNTER — ANESTHESIA (OUTPATIENT)
Dept: ENDOSCOPY | Age: 84
End: 2024-10-25
Payer: MEDICARE

## 2024-10-25 ENCOUNTER — HOSPITAL ENCOUNTER (OUTPATIENT)
Age: 84
Setting detail: OUTPATIENT SURGERY
Discharge: HOME | End: 2024-10-25
Attending: INTERNAL MEDICINE | Admitting: INTERNAL MEDICINE
Payer: MEDICARE

## 2024-10-25 ENCOUNTER — ANESTHESIA EVENT (OUTPATIENT)
Dept: ENDOSCOPY | Age: 84
End: 2024-10-25
Payer: MEDICARE

## 2024-10-25 VITALS
WEIGHT: 132.6 LBS | RESPIRATION RATE: 23 BRPM | OXYGEN SATURATION: 93 % | HEIGHT: 65 IN | TEMPERATURE: 97.8 F | HEART RATE: 77 BPM | BODY MASS INDEX: 22.09 KG/M2 | DIASTOLIC BLOOD PRESSURE: 69 MMHG | SYSTOLIC BLOOD PRESSURE: 142 MMHG

## 2024-10-25 LAB
ACINETOBACTER CALCOACETICUS-BAUMANNII BY PCR: NOT DETECTED
BLACTX-M ISLT/SPM QL: NORMAL
BLAIMP ISLT/SPM QL: NORMAL
BLAKPC ISLT/SPM QL: NORMAL
BLAOXA-48-LIKE ISLT/SPM QL: NORMAL
BLAVIM ISLT/SPM QL: NORMAL
C PNEUM DNA LOWER RESP QL NAA+NON-PROBE: NOT DETECTED
CYTOLOGY-NON GYN: NORMAL
CYTOLOGY-NON GYN: NORMAL
ENTEROBACTER CLOACAE COMPLEX BY PCR: NOT DETECTED
ESCHERICHIA COLI BY PCR: NOT DETECTED
FLUAV RNA LOWER RESP QL NAA+NON-PROBE: NOT DETECTED
FLUBV RNA LOWER RESP QL NAA+NON-PROBE: NOT DETECTED
HADV DNA LOWER RESP QL NAA+NON-PROBE: NOT DETECTED
HAEMOPHILUS INFLUENZAE BY PCR: NOT DETECTED
HCOV RNA LOWER RESP QL NAA+NON-PROBE: NOT DETECTED
HMPV RNA LOWER RESP QL NAA+NON-PROBE: NOT DETECTED
HPIV RNA LOWER RESP QL NAA+NON-PROBE: NOT DETECTED
KLEBSIELLA AEROGENES BY PCR: NOT DETECTED
KLEBSIELLA OXYTOCA BY PCR: NOT DETECTED
KLEBSIELLA PNEUMONIAE GROUP BY PCR: NOT DETECTED
L PNEUMO DNA LOWER RESP QL NAA+NON-PROBE: NOT DETECTED
M PNEUMO DNA LOWER RESP QL NAA+NON-PROBE: NOT DETECTED
MORAXELLA CATARRHALIS BY PCR: NOT DETECTED
PROTEUS SPECIES BY PCR: NOT DETECTED
PSEUDOMONAS AERUGINOSA BY PCR: NOT DETECTED
RESISTANT GENE MECA/C & MREJ BY PCR: NORMAL
RESISTANT GENE NDM BY PCR: NORMAL
RSV RNA LOWER RESP QL NAA+NON-PROBE: NOT DETECTED
RV+EV RNA LOWER RESP QL NAA+NON-PROBE: NOT DETECTED
SERRATIA MARCESCENS BY PCR: NOT DETECTED
SOURCE: NORMAL
SPECIMEN ACCEPTABILITY: NORMAL
STAPH AUREUS BY PCR: NOT DETECTED
STREP AGALACTIAE BY PCR: NOT DETECTED
STREP PNEUMONIAE BY PCR: NOT DETECTED
STREP PYOGENES BY PCR: NOT DETECTED

## 2024-10-25 PROCEDURE — 3700000000 HC ANESTHESIA ATTENDED CARE: Performed by: INTERNAL MEDICINE

## 2024-10-25 PROCEDURE — 88112 CYTOPATH CELL ENHANCE TECH: CPT

## 2024-10-25 PROCEDURE — 87206 SMEAR FLUORESCENT/ACID STAI: CPT

## 2024-10-25 PROCEDURE — 87116 MYCOBACTERIA CULTURE: CPT

## 2024-10-25 PROCEDURE — 87541 LEGION PNEUMO DNA AMP PROB: CPT

## 2024-10-25 PROCEDURE — 87798 DETECT AGENT NOS DNA AMP: CPT

## 2024-10-25 PROCEDURE — 3700000001 HC ADD 15 MINUTES (ANESTHESIA): Performed by: INTERNAL MEDICINE

## 2024-10-25 PROCEDURE — 31624 DX BRONCHOSCOPE/LAVAGE: CPT | Performed by: INTERNAL MEDICINE

## 2024-10-25 PROCEDURE — 7100000010 HC PHASE II RECOVERY - FIRST 15 MIN: Performed by: INTERNAL MEDICINE

## 2024-10-25 PROCEDURE — 2580000003 HC RX 258: Performed by: INTERNAL MEDICINE

## 2024-10-25 PROCEDURE — 87305 ASPERGILLUS AG IA: CPT

## 2024-10-25 PROCEDURE — 87070 CULTURE OTHR SPECIMN AEROBIC: CPT

## 2024-10-25 PROCEDURE — 87205 SMEAR GRAM STAIN: CPT

## 2024-10-25 PROCEDURE — 7100000000 HC PACU RECOVERY - FIRST 15 MIN: Performed by: INTERNAL MEDICINE

## 2024-10-25 PROCEDURE — 89051 BODY FLUID CELL COUNT: CPT

## 2024-10-25 PROCEDURE — 87631 RESP VIRUS 3-5 TARGETS: CPT

## 2024-10-25 PROCEDURE — 6360000002 HC RX W HCPCS: Performed by: NURSE ANESTHETIST, CERTIFIED REGISTERED

## 2024-10-25 PROCEDURE — 87102 FUNGUS ISOLATION CULTURE: CPT

## 2024-10-25 PROCEDURE — 7100000001 HC PACU RECOVERY - ADDTL 15 MIN: Performed by: INTERNAL MEDICINE

## 2024-10-25 PROCEDURE — 87486 CHLMYD PNEUM DNA AMP PROBE: CPT

## 2024-10-25 PROCEDURE — 88305 TISSUE EXAM BY PATHOLOGIST: CPT

## 2024-10-25 PROCEDURE — 87496 CYTOMEG DNA AMP PROBE: CPT

## 2024-10-25 PROCEDURE — 2500000003 HC RX 250 WO HCPCS: Performed by: NURSE ANESTHETIST, CERTIFIED REGISTERED

## 2024-10-25 PROCEDURE — 87581 M.PNEUMON DNA AMP PROBE: CPT

## 2024-10-25 PROCEDURE — 3609027000 HC BRONCHOSCOPY: Performed by: INTERNAL MEDICINE

## 2024-10-25 PROCEDURE — 87529 HSV DNA AMP PROBE: CPT

## 2024-10-25 RX ORDER — FENTANYL CITRATE 50 UG/ML
INJECTION, SOLUTION INTRAMUSCULAR; INTRAVENOUS
Status: DISCONTINUED | OUTPATIENT
Start: 2024-10-25 | End: 2024-10-25 | Stop reason: SDUPTHER

## 2024-10-25 RX ORDER — ROCURONIUM BROMIDE 10 MG/ML
INJECTION, SOLUTION INTRAVENOUS
Status: DISCONTINUED | OUTPATIENT
Start: 2024-10-25 | End: 2024-10-25 | Stop reason: SDUPTHER

## 2024-10-25 RX ORDER — PROPOFOL 10 MG/ML
INJECTION, EMULSION INTRAVENOUS
Status: DISCONTINUED | OUTPATIENT
Start: 2024-10-25 | End: 2024-10-25 | Stop reason: SDUPTHER

## 2024-10-25 RX ORDER — SODIUM CHLORIDE 9 MG/ML
25 INJECTION, SOLUTION INTRAVENOUS PRN
Status: DISCONTINUED | OUTPATIENT
Start: 2024-10-25 | End: 2024-10-25 | Stop reason: HOSPADM

## 2024-10-25 RX ORDER — SODIUM CHLORIDE 0.9 % (FLUSH) 0.9 %
5-40 SYRINGE (ML) INJECTION PRN
Status: DISCONTINUED | OUTPATIENT
Start: 2024-10-25 | End: 2024-10-25 | Stop reason: HOSPADM

## 2024-10-25 RX ORDER — ONDANSETRON 2 MG/ML
INJECTION INTRAMUSCULAR; INTRAVENOUS
Status: DISCONTINUED | OUTPATIENT
Start: 2024-10-25 | End: 2024-10-25 | Stop reason: SDUPTHER

## 2024-10-25 RX ORDER — SODIUM CHLORIDE 0.9 % (FLUSH) 0.9 %
5-40 SYRINGE (ML) INJECTION EVERY 12 HOURS SCHEDULED
Status: DISCONTINUED | OUTPATIENT
Start: 2024-10-25 | End: 2024-10-25 | Stop reason: HOSPADM

## 2024-10-25 RX ORDER — DEXAMETHASONE SODIUM PHOSPHATE 4 MG/ML
INJECTION, SOLUTION INTRA-ARTICULAR; INTRALESIONAL; INTRAMUSCULAR; INTRAVENOUS; SOFT TISSUE
Status: DISCONTINUED | OUTPATIENT
Start: 2024-10-25 | End: 2024-10-25 | Stop reason: SDUPTHER

## 2024-10-25 RX ORDER — SODIUM CHLORIDE 9 MG/ML
INJECTION, SOLUTION INTRAVENOUS CONTINUOUS
Status: DISCONTINUED | OUTPATIENT
Start: 2024-10-25 | End: 2024-10-25 | Stop reason: HOSPADM

## 2024-10-25 RX ORDER — MIDAZOLAM HYDROCHLORIDE 1 MG/ML
INJECTION, SOLUTION INTRAMUSCULAR; INTRAVENOUS
Status: DISCONTINUED | OUTPATIENT
Start: 2024-10-25 | End: 2024-10-25 | Stop reason: SDUPTHER

## 2024-10-25 RX ADMIN — SODIUM CHLORIDE: 9 INJECTION, SOLUTION INTRAVENOUS at 14:10

## 2024-10-25 RX ADMIN — PROPOFOL 150 MG: 10 INJECTION, EMULSION INTRAVENOUS at 14:21

## 2024-10-25 RX ADMIN — ROCURONIUM BROMIDE 40 MG: 10 INJECTION INTRAVENOUS at 14:21

## 2024-10-25 RX ADMIN — ONDANSETRON 4 MG: 2 INJECTION INTRAMUSCULAR; INTRAVENOUS at 14:31

## 2024-10-25 RX ADMIN — FENTANYL CITRATE 50 MCG: 50 INJECTION, SOLUTION INTRAMUSCULAR; INTRAVENOUS at 14:21

## 2024-10-25 RX ADMIN — DEXAMETHASONE SODIUM PHOSPHATE 10 MG: 4 INJECTION, SOLUTION INTRAMUSCULAR; INTRAVENOUS at 14:31

## 2024-10-25 RX ADMIN — MIDAZOLAM 1 MG: 1 INJECTION INTRAMUSCULAR; INTRAVENOUS at 14:10

## 2024-10-25 RX ADMIN — SUGAMMADEX 200 MG: 100 INJECTION, SOLUTION INTRAVENOUS at 14:43

## 2024-10-25 RX ADMIN — FENTANYL CITRATE 50 MCG: 50 INJECTION, SOLUTION INTRAMUSCULAR; INTRAVENOUS at 14:31

## 2024-10-25 RX ADMIN — SODIUM CHLORIDE: 9 INJECTION, SOLUTION INTRAVENOUS at 13:55

## 2024-10-25 RX ADMIN — MIDAZOLAM 1 MG: 1 INJECTION INTRAMUSCULAR; INTRAVENOUS at 14:21

## 2024-10-25 ASSESSMENT — PAIN - FUNCTIONAL ASSESSMENT
PAIN_FUNCTIONAL_ASSESSMENT: NONE - DENIES PAIN

## 2024-10-25 NOTE — OP NOTE
Bronchoscopy Procedure Note under General Anesthesia.    Patient: Erika Solares  : 1940        Surgeon: Roseanne Alarcon MD    Operation: Flexible diagnostic fiberoptic bronchoscopy with out fluoroscopy.     During procedure following procedures were performed:  Bronchioalveolar lavage: obtained from left lower lobe and left lingula.  Bronch washings obtained from left tracheobronchial tree including left upper lobe, lingula and left lower lobe including left lung.      Date of Operation: 10/25/2024    Indication for procedure: Abnormal CT chest dated 2024 reported as multiple centrilobular inflammatory nodules throughout both lungs worsening in the left lower lobe- ?  Bronchiolitis VS multilobar bronchopneumonia.  Flexible diagnostic bronchoscopy was performed to obtain lower respiratory specimen and to exclude atypical mycobacterial infection.      Pre operative diagnoses:   -Abnormal CT chest dated 2024.  -Multiple centrilobular inflammatory nodules throughout both lungs worsening in the left lower lobe.  -Ascending thoracic aortic fusiform dilatation measuring 4 cm.  -Hx of Chronic maxillary sinusitis.  She underwent sinus surgery on 2022 by Dr. Mohan MD.  -Bronchiectasis -under control.   -Hx of tobacco smoking for 4years. She quit smoking several years back.  -She underwent gallbladder surgery by Dr. Fabrizio MD at [facility] in 2022.    Post operative diagnoses:   -Abnormal CT chest dated 2024.  -Multiple centrilobular inflammatory nodules throughout both lungs worsening in the left lower lobe.  -Ascending thoracic aortic fusiform dilatation measuring 4 cm.  -Hx of Chronic maxillary sinusitis.  She underwent sinus surgery on 2022 by Dr. Mohan MD.  -Bronchiectasis -under control.   -Hx of tobacco smoking for 4years. She quit smoking several years back.  -She underwent gallbladder surgery by Dr. Fabrizio MD at  to be in appropriate position.    The scope was advanced into the trachea. The dae is sharp with no growths. Careful inspection of the tracheal lumen below the lower end of endotracheal tube was accomplished and found to have no growths.    The scope was  advanced into the right main bronchus and then into the Right upper lobe, Right middle lobe, and Right lower lobe bronchi and segmental bronchi.    The scope was sequentially passed into the Left main and then Left upper and lower bronchi and segmental bronchi.       Bronchioalveolar lavage: Bronchioalveolar lavage was performed from left lower lobe and left lingula. A good amount of Bronchioalveolar lavage I.e 30ml was obtained after instilling 150 ml of sterile 0.9NS. Bronchioalveolar lavage was sent to the laboratory for further analysis.    Bronchial washings: Bronchial washings were performed form left tracheobronchial tree including left upper lobe, lingula and left lower lobe including left lung. More than 25 ml of Bronchial washings were obtained and sent to the laboratory for further analysis.      Endobronchial findings:   Trachea: Normal mucosa.  Dae: Normal mucosa  Right main bronchus: Normal mucosa  Right upper lobe bronchus: Normal mucosa  Right Middle lobe bronchus: Normal mucosa  Right Lower lobe bronchus:Normal mucosa  Left main bronchus: Normal mucosa  Left upper lobe bronchus: Normal mucosa  Left lower lobe bronchus: Normal mucosa    No endobronchial lesions were seen.    The Patient was taken to the endoscopy recovery area in satisfactory condition.      Estimated Blood Loss: None    Complications: None.    Recommendation/Plan:  1. F/U on culturs results  2. F/U on cytology results.      Follow up:  is with me in approximately in [facility]week [facility] [address] [place] [address], [address], [place]. [address].  Patient was instructed to call my office with concerns and if follow up has not a been scheduled already. In the event

## 2024-10-25 NOTE — DISCHARGE INSTRUCTIONS
-Discharge patient home accompanied by a  if discharge criteria met.  -Patient to follow with my clinic ( Dr. Agnes MD) at [facility] as an out patient  in 1 to 2 weeks to follow bronch results by keeping her scheduled appointment.

## 2024-10-25 NOTE — PROGRESS NOTES
Erika admitted to the endoscopy unit for a bronchoscopy with BAL and washings with Dr. Alarcon. Procedural consent verified and signed.

## 2024-10-25 NOTE — PROGRESS NOTES
Recovery mode, arouses easily, denies discomfort. Dr. Alarcon discussed findings and plan of care with pt and , understandings verbalized.

## 2024-10-25 NOTE — PROGRESS NOTES
1458: Pt arrives to pacu, awakens to verbal stimuli. Pt on simple mask, respirations easy and unlabored. Denies pain. VSS    1515: Pt resting off and on with eyes closed, easily awakens to voice. Denies complaints    1528: Pt meets criteria for discharge from pacu, transported back to [facility] in stable condition. VSS

## 2024-10-25 NOTE — PROGRESS NOTES
Patient Information     Patient Name MRN Luis A Hanks [id number] Male 1958      Telephone Encounter by Isha Curry RN at 2017  9:23 AM     Author:  Isha Curry RN Service:  (none) Author Type:  NURS- Registered Nurse     Filed:  2017  9:37 AM Encounter Date:  2017 Status:  Signed     :  Isha Curry RN (NURS- Registered Nurse)            Patient states he was seen by Tin Fink MD yesterday, and he was thinking that he sprayed heracides in his yard last Saturday, and he is wondering if this could be what is causing his fevers. Patient states he still is running fevers.   Call now placed to  [place] at poison control, who states the Spectracide weedstop that the patient sprayed in his yard last Saturday, would not cause fever, or any of the patients symptoms from yesterday.  It is usually not very toxic she states.  Patient is relayed the message from poison control, and reminded to call poison control ASAP whenever a suspected poisoning happens.  ISHA CURRY RN ....................  2017   9:36 AM           Patient in endo for bronchoscopy. Scope  used. Pictures taken. Washings and BAL completed. Specimens labeled and sent to lab. Procedure completed. Patient tolerated well.

## 2024-10-25 NOTE — ANESTHESIA PRE PROCEDURE
Department of Anesthesiology  Preprocedure Note       Name:  Erika Solares   Age:  84 y.o.  :  1940                                          MRN:  [id number]         Date:  10/25/2024      Surgeon: Surgeon(s):  Roseanne Alarcon MD    Procedure: Procedure(s):  BRONCHOSCOPY BAL WASHINGS    Medications prior to admission:   Prior to Admission medications    Medication Sig Start Date End Date Taking? Authorizing Provider   dilTIAZem (CARDIZEM) 60 MG tablet Take 1 tablet by mouth   Yes Tracie Felipe MD   albuterol sulfate HFA (VENTOLIN HFA) 108 (90 Base) MCG/ACT inhaler Inhale 2 puffs into the lungs 4 times daily as needed for Wheezing 10/10/24 10/10/25 Yes Roseanne Alarcon MD   famotidine (PEPCID) 20 MG tablet Take 1 tablet by mouth nightly as needed   Yes Tracie Felipe MD   Ranolazine (RANEXA PO) Take 500 mg by mouth   Yes Tracie Felipe MD   atorvastatin (LIPITOR) 10 MG tablet Take 1 tablet by mouth daily 23  Yes Tracie Felipe MD   sertraline (ZOLOFT) 100 MG tablet Take 1 tablet by mouth daily 23  Yes Tracie Felipe MD   aspirin 81 MG tablet Take 1 tablet by mouth daily   Yes Tracie Felipe MD   Calcium Carb-Cholecalciferol (CALCIUM/VITAMIN D PO) Take 2 tablets by mouth daily   Yes Tracie Felipe MD   Multiple Vitamins-Minerals (PRESERVISION AREDS PO) Take 2 capsules by mouth daily   Yes Tracie Felipe MD   Lactobacillus (PROBIOTIC ACIDOPHILUS PO) Take 1 capsule by mouth daily   Yes Tracie Felipe MD   Acetaminophen 325 MG CAPS Take by mouth as needed   Yes Tracie Felipe MD   UNABLE TO FIND OSTEOBIFLEX    Tracie Felipe MD   clopidogrel (PLAVIX) 75 MG tablet Take 1 tablet by mouth daily 23   Tracie Felipe MD   metoprolol succinate (TOPROL XL) 25 MG extended release tablet daily 1/2 tab  Patient not taking: Reported on 10/10/2024 12/20/23   Tracie Felipe MD   amLODIPine (NORVASC) 
\"LABANTI\"    Drug/Infectious Status (If Applicable):  No results found for: \"HIV\", \"HEPCAB\"    COVID-19 Screening (If Applicable): No results found for: \"COVID19\"        Anesthesia Evaluation  Patient summary reviewed  Airway: Mallampati: I          Dental: normal exam         Pulmonary:normal exam    (+) pneumonia:                                      Cardiovascular:    (+) hypertension:, hyperlipidemia                  Neuro/Psych:               GI/Hepatic/Renal:   (+) GERD:          Endo/Other:                     Abdominal: normal exam            Vascular:          Other Findings:             Anesthesia Plan      general     ASA 2       Induction: intravenous.      Anesthetic plan and risks discussed with patient.      Plan discussed with CRNA and attending.                    JOSEE Cervantes - CRNA   10/25/2024

## 2024-10-25 NOTE — ANESTHESIA POSTPROCEDURE EVALUATION
Department of Anesthesiology  Postprocedure Note    Patient: Erika Solares  MRN: [id number]  YOB: 1940  Date of evaluation: 10/25/2024    Procedure Summary       Date: 10/25/24 Room / Location: [place] [address] / [facility]    Anesthesia Start: 1410 Anesthesia Stop: 1502    Procedure: BRONCHOSCOPY BAL WASHINGS Diagnosis:       Bronchiectasis without complication (HCC)      (Bronchiectasis without complication (HCC) [J47.9])    Surgeons: Roseanne Alarcon MD Responsible Provider: Sohail Albright MD    Anesthesia Type: general ASA Status: 3            Anesthesia Type: No value filed.    Duc Phase I: Duc Score: 9    Duc Phase II: Duc Score: 10    Anesthesia Post Evaluation    Patient location during evaluation: PACU  Patient participation: complete - patient participated  Level of consciousness: awake  Airway patency: patent  Nausea & Vomiting: no vomiting and no nausea  Cardiovascular status: hemodynamically stable  Respiratory status: acceptable and nasal cannula  Hydration status: stable  Pain management: adequate    No notable events documented.

## 2024-10-25 NOTE — H&P
[x]   Fungal cultures:  Negative                         [x]   Routine cultures:  Haemophilus influenzae.   [x]   Viral cultures:      Negative.                        [x]       Transbronchial biopsy:  [facility]                                    RECV: 10/25/2019  [address]                                     RPTD: 10/26/2019  [place] [address]   Clinical Information: ABNORMAL CT    FINAL DIAGNOSIS:  Lung, right middle lobe, medial segment biopsy:   Negative for malignancy.   No intact pulmonary alveolar tissue for evaluation.  See microscopic.      Transbronchial brushes: Haemophilus influenzae.  No fungus.     HRCT of chest: Not done- please see following addendum.  Addendum done on 3/26/20 at 10:28 AM EDT:  I spoke with Dr. Faarz Gu MD radiologist as a part of peer to peer discussion to get above HRCT to get approved. How ever he told me that the CT chest ordered above is too soon. It should be done after September, 2020.     Please reschedule her HRCT of chest to be done after September, 2020. Please arrange for follow up 1 week after above HRCT of chest.   Will also order full PFTS to be done at least 1 week before clinic visit.    HRCT of chest: 10/6/2020    The latest/above CT chest (Left) was compared with her old CT chest ( Right).     PFTS: 10/6/2020          CT chest with out contrast: Performed on 7 October 2021        Modified barium swallow test: Performed on 20 October 2020              CT chest with out contrast: Performed on 24 January 2023        The above radiological study films were reviewed by me.      CT scan of chest without IV contrast performed on 25 September 2024:                Pulmonary function tests: Not done.      Assessment:  -Abnormal CT chest dated 25 September 2024 reported as multiple centrilobular inflammatory nodules throughout both lungs worsening in the left lower lobe- ?  Bronchiolitis VS multilobar bronchopneumonia.  She underwent  Bronchoscopy on 25 October 2019 and found to have Haemophilus influenzae infection in the past.  Had a bronch cultures were negative for acid-fast bacilli.  She was treated in the past with Levaquin in the past for 7days.   -Ascending thoracic aortic fusiform dilatation measuring 4 cm noted on her CT scan of chest dated 25 September 2024-unchanged compared to previous CT scans performed in 2023.  She is currently discussed with Dr. Florencia MD.  -Hx of Chronic maxillary sinusitis.  She underwent sinus surgery on 28 March 2022 by Dr. Mohan MD.  -Bronchiectasis -nontender under control. She refused to use any maintenance inhaled steroid due to development of sore throat.  -Hx of tobacco smoking for 4years. She quit smoking several years back.  -She underwent gallbladder surgery by Dr. Fabrizio MD at [facility] in March 2022.    Recommendations/Plan:  -She was advised to stop taking Plavix for a minimum of 5 days before planned bronchoscopy procedure once got approved by her cardiologist Dr. Mariela MD.  -Patient for Bronchoscopy for Broncho alveolar lavage (BAL) and washings with out fluoroscopy in endoscopy suite at [facility] under anesthesia from anaesthesilogy department to further evaluate the etiology of abnormal CT scan of chest with worsening of bilateral pneumonia due to concern for atypical mycobacterial infection including TORIE.  -Erika Solares educated and informed about bronchoscopy procedure,method, complicantions of procedure including but not limited to development of pneumothorax with requirement of chest tube placement. She agreed for the procedure and verbalizes understanding.  -Continue patient on Albuterol HFA 90mcg/Spray MDI, 2puffs  Q6Hprn.  She is currently not using any inhalers.  -Will hold off on Arnuity Ellipta 100mcg/actuation DPI 1puff daily in am due to suspicion for atypical mycobacterial infection until her bronchoscopy results were available. Erika MIMS

## 2024-10-25 NOTE — PRE SEDATION
[facility] Endoscopy  Sedation Pre-Procedure Note    Patient Name: Erika Solares    YOB: 1940   Room/Bed: [facility] RM/NONE  Medical Record Number: [id number]  Date: 10/25/2024  Time: 3:26 PM     Indication:  Pain control for Flexible Fibrooptic Bronchoscopy.    Consent: I have discussed with the patient and/or the patient representative the indication, alternatives, and the possible risks and/or complications of the planned procedure and the anesthesia methods. The patient and/or patient representative appear to understand and agree to proceed.    Vital Signs: BP (!) 147/70   Pulse 83   Temp 98 °F (36.7 °C) (Temporal)   Resp 23   Ht 1.651 m (5' 5\")   Wt 60.1 kg (132 lb 9.6 oz)   SpO2 94%   BMI 22.07 kg/m²       Past Medical History:  Past Medical History:   Diagnosis Date    Hypertension          Allergy:  Allergies   Allergen Reactions    Augmentin [Amoxicillin-Pot Clavulanate]     Seasonal     Bactrim [Sulfamethoxazole-Trimethoprim] Rash         Past Surgical History:  Past Surgical History:   Procedure Laterality Date    BRONCHOSCOPY N/A 10/25/2019    FIBROOPTIC BRONCHOSCOPY W/ BIOPSY UNDER FLUROSCOPY performed by Roseanne Alarcon MD at [facility] Endoscopy    BRONCHOSCOPY  10/25/2019    BRONCHOSCOPY ALVEOLAR LAVAGE performed by Roseanne Alarcon MD at [facility] Endoscopy    BRONCHOSCOPY  10/25/2019    BRONCHOSCOPY BRUSHINGS performed by Roseanne Alarcon MD at [facility] Endoscopy    BRONCHOSCOPY  10/25/2019    BRONCHOSCOPY FLUOROSCOPY performed by Roseanne Alarcon MD at [facility] Endoscopy    CARDIAC SURGERY  08/2023    CHOLECYSTECTOMY  03/2023    COLONOSCOPY  11/23/2015    Dr Samuel    SINUS SURGERY  2021       Medications:   Current Facility-Administered Medications   Medication Dose Route Frequency Provider Last Rate Last Admin    sodium chloride flush 0.9 % injection

## 2024-10-26 LAB
BAL CHARACTER: ABNORMAL
BAL COLLECTION SITE: ABNORMAL
BAL COLOR: COLORLESS
CILIATED/EPITHELIAL CELLS BAL: 9 % (ref 0–5)
FUNGUS SPEC FUNGUS STN: NORMAL
FUNGUS SPEC FUNGUS STN: NORMAL
MACROPHAGE/MONOCYTE BAL: 1 % (ref 86–100)
NEUTROPHILS NFR BRONCH MANUAL: 90 % (ref 0–3)
PATHOLOGIST REVIEW: ABNORMAL
RBC BAL: 28 /CUMM
SPECIMEN SOURCE: NORMAL
TOTAL NUCLEATED CELLS BAL: 247 /CUMM
TOTAL VOLUME RECEIVED BAL: 25 ML

## 2024-10-27 LAB
AFB CULTURE & SMEAR: NORMAL
AFB CULTURE & SMEAR: NORMAL
BACTERIA SPEC RESP CULT: NORMAL
BACTERIA SPEC RESP CULT: NORMAL
GRAM STN SPEC: NORMAL
GRAM STN SPEC: NORMAL

## 2024-10-28 LAB
ACID FAST MOD KINY STN SPEC: NORMAL
ACID FAST MOD KINY STN SPEC: NORMAL
AFB CULTURE & SMEAR: NORMAL
AFB CULTURE & SMEAR: NORMAL
FUNGUS SPEC CULT: NORMAL
FUNGUS SPEC CULT: NORMAL
FUNGUS SPEC FUNGUS STN: NORMAL
FUNGUS SPEC FUNGUS STN: NORMAL
GALACTOMANNAN AG BAL QL: NEGATIVE
GALACTOMANNAN AG SPEC IA-ACNC: 0.06

## 2024-10-30 LAB
CMV DNA SPEC QL NAA+PROBE: NOT DETECTED
HSV1 DNA SPEC QL NAA+PROBE: NOT DETECTED
HSV2 DNA SPEC QL NAA+PROBE: NOT DETECTED
SPECIMEN SOURCE: NORMAL

## 2024-10-30 NOTE — PROGRESS NOTES
[facility]  Pulmonary medicine clinic follow up note.    Patient: Erika Solares  : 1940      Chief complaint/[place]:     Lung Nodule Screening     [] Qualifies    [x] Does not qualify   [] Declined    [] Completed    Chief complaint and [place]:  Erika Solares is a 84 y.o.old female came for follow up regarding her abnormal CT of chest and bronchiectasis. She underwent flexible diagnostic bronchoscopy without fluoroscopy by me on 2024.  She came for follow-up to go over the bronchoscopy results.    During flexible diagnostic bronchoscopy procedure patient underwent  Bronchioalveolar lavage: obtained from left lower lobe and left lingula.  Bronch washings obtained from left tracheobronchial tree including left upper lobe, lingula and left lower lobe including left lung.    She underwent sinus surgery 2022 by Dr. Mohan MD. her cough got significantly improved.    She underwent cholecystectomy surgery by Dr. Navjot MD at [facility] in .    On today's questioning:  She denies cough or expectoration.  She denies hemoptysis.  She denies fever or chills.   She denies recent hospitalizations or emergency room visits.     She is not using any maintenance inhalerusing   She is using rescue inhaler/nebs frequently I.e 4 times per day.     She denies recent loss of weight or appetite changes.   She denies recent decline in functional status.    She was prescribed with Asmanex HFA 100mcg, 2puffs  BID at the last visit as a treatment for her bronchiectasis. How ever she developed oral thrush and glossitis. She was treated with Oral Nystatin rinses. Her soreness of tongue is improving.    She is currently not using any oxygen supplementation at rest, exercise or during sleep/at night time. No recent hospitalizations or emergency room visits.     She had normal Mammogram: Normal in .    She had last Colonoscopy

## 2024-10-31 ENCOUNTER — TELEPHONE (OUTPATIENT)
Dept: PULMONOLOGY | Age: 84
End: 2024-10-31

## 2024-10-31 ENCOUNTER — OFFICE VISIT (OUTPATIENT)
Dept: PULMONOLOGY | Age: 84
End: 2024-10-31

## 2024-10-31 VITALS
HEART RATE: 77 BPM | DIASTOLIC BLOOD PRESSURE: 84 MMHG | HEIGHT: 65 IN | TEMPERATURE: 98.1 F | SYSTOLIC BLOOD PRESSURE: 128 MMHG | WEIGHT: 135.6 LBS | OXYGEN SATURATION: 97 % | BODY MASS INDEX: 22.59 KG/M2

## 2024-10-31 DIAGNOSIS — J18.9 ATYPICAL PNEUMONIA: ICD-10-CM

## 2024-10-31 DIAGNOSIS — J47.9 BRONCHIECTASIS WITHOUT COMPLICATION (HCC): Primary | ICD-10-CM

## 2024-10-31 DIAGNOSIS — R93.89 ABNORMAL CT OF THE CHEST: ICD-10-CM

## 2024-10-31 RX ORDER — FLUTICASONE FUROATE 100 UG/1
100 POWDER RESPIRATORY (INHALATION) DAILY
Qty: 1 EACH | Refills: 11 | Status: SHIPPED | OUTPATIENT
Start: 2024-10-31 | End: 2024-10-31 | Stop reason: CLARIF

## 2024-10-31 RX ORDER — FLUTICASONE PROPIONATE 110 UG/1
2 AEROSOL, METERED RESPIRATORY (INHALATION) 2 TIMES DAILY
Qty: 12 G | Refills: 11 | Status: SHIPPED | OUTPATIENT
Start: 2024-10-31 | End: 2025-10-31

## 2024-10-31 RX ORDER — PREDNISONE 10 MG/1
10 TABLET ORAL
COMMUNITY
Start: 2024-10-24

## 2024-10-31 RX ORDER — DOXYCYCLINE 100 MG/1
100 CAPSULE ORAL 2 TIMES DAILY
Qty: 16 CAPSULE | Refills: 0 | Status: SHIPPED | OUTPATIENT
Start: 2024-10-31 | End: 2024-11-08

## 2024-10-31 NOTE — PATIENT INSTRUCTIONS
Recommendations/Plan:   -Doxycycline 100mg po bid for 8days to atypical pneumonia due to predominant neutrophil count noted on her BAL differential. No refills.  -She is currently taking prednisone from her orthopedic surgeon from [facility].  She was prescribed with prednisone 10 mg 3 tablets daily for 6 days followed by 2 tablets daily for 3 days followed by 1 tablet daily for 3 days.  She completed 6 days of prednisone so far.  She is going to start 20 mg dose from tomorrow. She was detailed about mechanism of action of drug along with associated side effects. She agreed to take the risk and medication. She verbalizes understanding. She was advised to take prednisone after food.  -She is currently taking Plavix 75 mg p.o. daily and aspirin 81 mg p.o. daily as per her prescription by her cardiologist Dr. Mariela MD.  -Continue patient on Albuterol HFA 90mcg/Spray MDI, 2puffs  Q6Hprn.    -She was advised to restart taking Arnuity Ellipta 100mcg/actuation DPI 1puff daily in am. Erika Solares educated and demonstrated in my office how to use Arnuity Ellipta . She verbalizes understanding. She was detailed about mechanism of action of drug along with associated side effects. She agreed to take the risk and medication. She verbalizes understanding.  -She was advised to keep her scheduled follow-up appointment with Ear, Nose and Throat specialist Dr. Jie MD/ENT Specialist at [place], [place] for management of her chronic sinusitis.  -Patient educated to update his pneumococcal vaccine with family physician and take influenza vaccine in coming season with out fail.   -She is going to discuss with her family physician and Dr. 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  -Erika Solares was advised to make early appointment with my clinic if she develops any constitutional symptoms including loss of

## 2024-10-31 NOTE — TELEPHONE ENCOUNTER
Patient called and stated the medication Arnuity you sent to the pharm is going to be over 100$... She was asking if there was another medication you could give her that would be covered by her insurance....    Please advise

## 2024-11-04 DIAGNOSIS — R93.89 ABNORMAL CT OF THE CHEST: ICD-10-CM

## 2024-11-04 DIAGNOSIS — B49 FUNGAL INFECTION OF LUNG: Primary | ICD-10-CM

## 2024-11-04 DIAGNOSIS — J47.9 BRONCHIECTASIS WITHOUT COMPLICATION (HCC): ICD-10-CM

## 2024-11-04 DIAGNOSIS — A31.9 ACID FAST BACILLUS: ICD-10-CM

## 2024-11-07 LAB
AFB CULTURE & SMEAR: NORMAL
AFB CULTURE & SMEAR: NORMAL
FUNGUS SPEC CULT: ABNORMAL
FUNGUS SPEC CULT: ABNORMAL
FUNGUS SPEC FUNGUS STN: ABNORMAL
ORGANISM: ABNORMAL

## 2024-11-08 ENCOUNTER — TELEPHONE (OUTPATIENT)
Dept: PULMONOLOGY | Age: 84
End: 2024-11-08

## 2024-11-08 NOTE — TELEPHONE ENCOUNTER
[facility] called in requesting to speak with Dr. Alarcon regarding patient's positive AFB smear. I told her that I would send a message to Dr. Alarcon and have him give her a call once he can. She verbalized understanding.  -  Dr. Alarcon please call Tonia (Jessica). Thank you!    Callback #: [phone]

## 2024-11-11 NOTE — TELEPHONE ENCOUNTER
Patient called back and was given results. She asked that once final results are resulted for the AFB for you to please give her a call to let her know.       I advised patient to wear N95 mask until she hears back from our office.   She will call Dr. Mcneil's office to get scheduled for a new patient appointment. Number provided to patient.   Thanks!

## 2024-11-12 ENCOUNTER — TELEPHONE (OUTPATIENT)
Dept: PULMONOLOGY | Age: 84
End: 2024-11-12

## 2024-11-12 NOTE — TELEPHONE ENCOUNTER
Pt called in stating that to reach Dr. Mcneil office as you placed a referral to them. They have not reached out to patient yet. No one will answer the office number we have for them... please advise!

## 2024-11-13 ENCOUNTER — TELEPHONE (OUTPATIENT)
Dept: PULMONOLOGY | Age: 84
End: 2024-11-13

## 2024-11-13 NOTE — TELEPHONE ENCOUNTER
I called 2x 11.12.24 to Dr. Mcneil's office to try to check the status of the referral that was placed 11.4.24. I left a voicemail to have them call our office back as soon as possible, however we have not received a call back.     I had Kat call today, and she was unable to speak with anyone, or unable to leave a message to their office.    Verified internal referral was place correctly.    Please advise per Dr. Alarcon.

## 2024-11-13 NOTE — TELEPHONE ENCOUNTER
The phone number in [employer] and on the [employer] website is not correct for Dr Mcneil. I spoke with his practice manager who clarified their phone number as [phone], she has placed a ticket to have his office number corrected. The [facility] office is pulling the referral and will call the patient to schedule her an appointment with Dr. Mcneil.

## 2024-11-14 ENCOUNTER — TELEPHONE (OUTPATIENT)
Dept: INFECTIOUS DISEASES | Age: 84
End: 2024-11-14

## 2024-11-14 NOTE — TELEPHONE ENCOUNTER
Called patient to schedule appointment for [facility] per referral. Appointment Monday, Dec. 9 @ 11:00.

## 2024-11-18 ENCOUNTER — TELEPHONE (OUTPATIENT)
Dept: PULMONOLOGY | Age: 84
End: 2024-11-18

## 2024-11-25 LAB
FUNGUS SPEC CULT: NORMAL
FUNGUS SPEC FUNGUS STN: NORMAL

## 2024-11-29 LAB — MISC. #1 REFERENCE GROUP TEST: NORMAL

## 2024-12-09 ENCOUNTER — OFFICE VISIT (OUTPATIENT)
Dept: INFECTIOUS DISEASES | Age: 84
End: 2024-12-09
Payer: MEDICARE

## 2024-12-09 VITALS
RESPIRATION RATE: 20 BRPM | TEMPERATURE: 97 F | SYSTOLIC BLOOD PRESSURE: 124 MMHG | BODY MASS INDEX: 21.99 KG/M2 | HEART RATE: 94 BPM | OXYGEN SATURATION: 98 % | HEIGHT: 65 IN | WEIGHT: 132 LBS | DIASTOLIC BLOOD PRESSURE: 82 MMHG

## 2024-12-09 DIAGNOSIS — A31.0 MYCOBACTERIUM AVIUM COMPLEX (HCC): Primary | ICD-10-CM

## 2024-12-09 DIAGNOSIS — J47.9 BRONCHIECTASIS WITHOUT COMPLICATION (HCC): ICD-10-CM

## 2024-12-09 DIAGNOSIS — R93.89 ABNORMAL CT OF THE CHEST: ICD-10-CM

## 2024-12-09 DIAGNOSIS — J18.9 PNEUMONIA OF RIGHT MIDDLE LOBE DUE TO INFECTIOUS ORGANISM: ICD-10-CM

## 2024-12-09 DIAGNOSIS — R05.3 CHRONIC COUGH: ICD-10-CM

## 2024-12-09 PROCEDURE — 1159F MED LIST DOCD IN RCRD: CPT | Performed by: STUDENT IN AN ORGANIZED HEALTH CARE EDUCATION/TRAINING PROGRAM

## 2024-12-09 PROCEDURE — 99204 OFFICE O/P NEW MOD 45 MIN: CPT | Performed by: STUDENT IN AN ORGANIZED HEALTH CARE EDUCATION/TRAINING PROGRAM

## 2024-12-09 PROCEDURE — 1123F ACP DISCUSS/DSCN MKR DOCD: CPT | Performed by: STUDENT IN AN ORGANIZED HEALTH CARE EDUCATION/TRAINING PROGRAM

## 2024-12-09 PROCEDURE — 1125F AMNT PAIN NOTED PAIN PRSNT: CPT | Performed by: STUDENT IN AN ORGANIZED HEALTH CARE EDUCATION/TRAINING PROGRAM

## 2024-12-09 RX ORDER — NICOTINE 14MG/24HR
1 PATCH, TRANSDERMAL 24 HOURS TRANSDERMAL DAILY
COMMUNITY

## 2024-12-09 RX ORDER — IBUPROFEN 200 MG
1 CAPSULE ORAL DAILY
COMMUNITY

## 2024-12-09 NOTE — PROGRESS NOTES
by mouth, Disp: , Rfl:     UNABLE TO FIND, OSTEOBIFLEX, Disp: , Rfl:     albuterol sulfate HFA (VENTOLIN HFA) 108 (90 Base) MCG/ACT inhaler, Inhale 2 puffs into the lungs 4 times daily as needed for Wheezing, Disp: 18 g, Rfl: 11    famotidine (PEPCID) 20 MG tablet, Take 1 tablet by mouth nightly as needed, Disp: , Rfl:     atorvastatin (LIPITOR) 10 MG tablet, Take 1 tablet by mouth daily, Disp: , Rfl:     clopidogrel (PLAVIX) 75 MG tablet, Take 1 tablet by mouth daily, Disp: , Rfl:     sertraline (ZOLOFT) 100 MG tablet, Take 1 tablet by mouth daily, Disp: , Rfl:     Calcium Carb-Cholecalciferol (CALCIUM/VITAMIN D PO), Take 2 tablets by mouth daily, Disp: , Rfl:     Multiple Vitamins-Minerals (PRESERVISION AREDS PO), Take 2 capsules by mouth daily, Disp: , Rfl:     Lactobacillus (PROBIOTIC ACIDOPHILUS PO), Take 1 capsule by mouth daily, Disp: , Rfl:     Acetaminophen 325 MG CAPS, Take by mouth as needed, Disp: , Rfl:     predniSONE (DELTASONE) 10 MG tablet, 1 tablet (Patient not taking: Reported on 12/9/2024), Disp: , Rfl:     fluticasone (FLOVENT HFA) 110 MCG/ACT inhaler, Inhale 2 puffs into the lungs 2 times daily (Patient not taking: Reported on 12/9/2024), Disp: 12 g, Rfl: 11    Spacer/Aero-Holding Chambers LUCHO, 1 Device by Does not apply route in the morning and at bedtime, Disp: 1 each, Rfl: 0    Ranolazine (RANEXA PO), Take 500 mg by mouth (Patient not taking: Reported on 12/9/2024), Disp: , Rfl:     metoprolol succinate (TOPROL XL) 25 MG extended release tablet, daily 1/2 tab (Patient not taking: Reported on 10/10/2024), Disp: , Rfl:     aspirin 81 MG tablet, Take 1 tablet by mouth daily (Patient not taking: Reported on 12/9/2024), Disp: , Rfl:     amLODIPine (NORVASC) 2.5 MG tablet, Take 2 tablets by mouth daily (Patient not taking: Reported on 10/10/2024), Disp: , Rfl:     GLUCOSAMINE CHONDROITIN COMPLX PO, Take 1 tablet by mouth daily (Patient not taking: Reported on 10/10/2024), Disp: , Rfl:

## 2024-12-12 ENCOUNTER — TELEPHONE (OUTPATIENT)
Dept: INFECTIOUS DISEASES | Age: 84
End: 2024-12-12

## 2024-12-12 RX ORDER — ETHAMBUTOL HYDROCHLORIDE 400 MG/1
1600 TABLET, FILM COATED ORAL
Qty: 144 TABLET | Refills: 4 | Status: SHIPPED | OUTPATIENT
Start: 2024-12-13 | End: 2026-02-06

## 2024-12-12 RX ORDER — AZITHROMYCIN 500 MG/1
500 TABLET, FILM COATED ORAL
Qty: 36 TABLET | Refills: 4 | Status: SHIPPED | OUTPATIENT
Start: 2024-12-13 | End: 2026-02-06

## 2024-12-12 RX ORDER — RIFABUTIN 150 MG/1
300 CAPSULE ORAL DAILY
Qty: 180 CAPSULE | Refills: 3 | Status: SHIPPED | OUTPATIENT
Start: 2024-12-12 | End: 2025-12-18

## 2024-12-12 NOTE — TELEPHONE ENCOUNTER
Called [facility], spoke with Denisse after getting a message from Dr. Farr stating he talked to patients cardiologist, Dr. Beard and patient, Erika can stop the Ranolazine and start the Refabutin (Mac) treatment.  Denisse voiced understanding.

## 2024-12-16 LAB — AFB CULTURE & SMEAR: NORMAL

## 2024-12-19 ENCOUNTER — TELEPHONE (OUTPATIENT)
Dept: PULMONOLOGY | Age: 84
End: 2024-12-19

## 2024-12-19 ENCOUNTER — TELEPHONE (OUTPATIENT)
Dept: INFECTIOUS DISEASES | Age: 84
End: 2024-12-19

## 2024-12-19 NOTE — TELEPHONE ENCOUNTER
Returned call to patient and informed her that it was okay by Dr. Mcneil for her to use her Albuterol Inhaler with the medication that was prescribed by him.  Patient asked if she needed to use the inhaler, I told her she would have to ask the doctor who prescribed it to her.  She stated that Dr. Kumar prescribed it to her.

## 2024-12-19 NOTE — TELEPHONE ENCOUNTER
Patient called in to verify usage on her albuterol inhaler. I read her Dr. Alarcon's directions on the prescription, and verified refills with the pharmacy. Patient verbalized understanding.

## 2024-12-30 LAB — AFB CULTURE & SMEAR: NORMAL

## 2024-12-31 ENCOUNTER — TELEPHONE (OUTPATIENT)
Dept: PULMONOLOGY | Age: 84
End: 2024-12-31

## 2024-12-31 NOTE — TELEPHONE ENCOUNTER
Angela from [facility] called asking about patients diagnosis of Mycobacterium avium complex, and if we plant to treat. After reviewing notes, patient is being seen and treated by infectious disease, Dr. Mcneil.   I informed her what medication patient is currently being treated with, and routed office note through epic to her.   She verbalized understanding and will call back with any further questions or concerns.

## 2025-01-06 ENCOUNTER — TELEPHONE (OUTPATIENT)
Dept: INFECTIOUS DISEASES | Age: 85
End: 2025-01-06

## 2025-01-06 RX ORDER — RIFABUTIN 150 MG/1
300 CAPSULE ORAL
Qty: 318 CAPSULE | Refills: 0 | OUTPATIENT
Start: 2025-01-06 | End: 2025-01-07

## 2025-01-06 NOTE — TELEPHONE ENCOUNTER
Returned call to patient after talking to Dr. Mcneil about the symptoms patient is experiencing since taking the three new medications.  I advised her to take the Imodium for her diarrhea and start taking her Rifbutin every Monday, Wednesday and Friday as her other medications and see if that helps her symptoms.  Patient stated she would try that.

## 2025-01-24 ENCOUNTER — TELEPHONE (OUTPATIENT)
Dept: PULMONOLOGY | Age: 85
End: 2025-01-24

## 2025-02-06 ENCOUNTER — TELEPHONE (OUTPATIENT)
Dept: PULMONOLOGY | Age: 85
End: 2025-02-06

## 2025-02-06 NOTE — TELEPHONE ENCOUNTER
Phoned Oscar at [phone] to start prior authorization request for CT chest without contrast scheduled for 2/17/25 at [facility].    Spoke with Marley PABLO with [employer].  After going through clinical question over the phone approval was received for chest CT.    Authorization number: [id number]  Valid: 2/6/25-8/5/25  Case number: [id number]

## 2025-02-06 NOTE — TELEPHONE ENCOUNTER
Cover sheet with insurance communication & CT order faxed to Lucita at [employer] (see media).

## 2025-02-19 ENCOUNTER — HOSPITAL ENCOUNTER (OUTPATIENT)
Dept: CT IMAGING | Age: 85
Discharge: HOME | End: 2025-02-19
Attending: RADIOLOGY

## 2025-02-19 DIAGNOSIS — J18.9 ATYPICAL PNEUMONIA: ICD-10-CM

## 2025-02-19 DIAGNOSIS — J47.9 BRONCHIECTASIS WITHOUT COMPLICATION (HCC): ICD-10-CM

## 2025-02-19 DIAGNOSIS — R93.89 ABNORMAL CT OF THE CHEST: ICD-10-CM

## 2025-02-19 DIAGNOSIS — Z00.6 ENCOUNTER FOR EXAMINATION FOR NORMAL COMPARISON AND CONTROL IN CLINICAL RESEARCH PROGRAM: ICD-10-CM

## 2025-02-20 ENCOUNTER — OFFICE VISIT (OUTPATIENT)
Dept: PULMONOLOGY | Age: 85
End: 2025-02-20
Payer: MEDICARE

## 2025-02-20 VITALS
OXYGEN SATURATION: 97 % | WEIGHT: 129.4 LBS | SYSTOLIC BLOOD PRESSURE: 122 MMHG | BODY MASS INDEX: 21.56 KG/M2 | HEART RATE: 80 BPM | HEIGHT: 65 IN | DIASTOLIC BLOOD PRESSURE: 76 MMHG | TEMPERATURE: 97.7 F

## 2025-02-20 DIAGNOSIS — R93.89 ABNORMAL CT OF THE CHEST: ICD-10-CM

## 2025-02-20 DIAGNOSIS — Z22.39 MYCOBACTERIUM AVIUM INTRACELLULARE COLONIZATION: ICD-10-CM

## 2025-02-20 DIAGNOSIS — J47.9 BRONCHIECTASIS WITHOUT COMPLICATION (HCC): Primary | ICD-10-CM

## 2025-02-20 DIAGNOSIS — A31.0 MYCOBACTERIUM AVIUM-INTRACELLULARE INFECTION (HCC): ICD-10-CM

## 2025-02-20 PROCEDURE — 99214 OFFICE O/P EST MOD 30 MIN: CPT | Performed by: INTERNAL MEDICINE

## 2025-02-20 PROCEDURE — 1159F MED LIST DOCD IN RCRD: CPT | Performed by: INTERNAL MEDICINE

## 2025-02-20 PROCEDURE — 1123F ACP DISCUSS/DSCN MKR DOCD: CPT | Performed by: INTERNAL MEDICINE

## 2025-02-20 RX ORDER — RIFABUTIN 150 MG/1
CAPSULE ORAL
COMMUNITY
Start: 2025-02-19

## 2025-02-20 RX ORDER — AZITHROMYCIN 500 MG/1
500 TABLET, FILM COATED ORAL
COMMUNITY
Start: 2025-02-19

## 2025-02-20 RX ORDER — ETHAMBUTOL HYDROCHLORIDE 400 MG/1
TABLET, FILM COATED ORAL
COMMUNITY
Start: 2025-01-10

## 2025-02-20 NOTE — PROGRESS NOTES
Neck Circumference -   12.75 inches  Mallampati - 1    Lung Nodule Screening     [] Qualifies    [x] Does not qualify   [] Declined    [] Completed    
reschedule her HRCT of chest to be done after September, 2020. Please arrange for follow up 1 week after above HRCT of chest.   Will also order full PFTS to be done at least 1 week before clinic visit.    HRCT of chest: 10/6/2020    The latest/above CT chest (Left) was compared with her old CT chest ( Right).     PFTS: 10/6/2020  The above test result reviewed.    CT chest with out contrast: Performed on 7 October 2021        Modified barium swallow test: Performed on 20 October 2020              CT chest with out contrast: Performed on 24 January 2023        The above radiological study films were reviewed by me.    CT scan of chest without IV contrast performed on 25 September 2024:  The above radiological study films were reviewed by me.      Pulmonary function tests: Not done.    Bronchoscopy results:  Hospital performed: [facility].  Date of procedure: 25 October 2024  Procedure: Bronchioalveolar lavage: obtained from left lower lobe and left lingula.  Bronch washings obtained from left tracheobronchial tree including left upper lobe, lingula and left lower lobe including left lung.                            BAL Differential:      Bronch washings/BAL ( Broncho alveolar lavage):   CMV by PCR: Negative  Herpes simplex virus by PCR: Negative  Partially acid-fast bacilli organisms: Negative  Acid-fast bacilli: Mycobacterium avium intracellular complex  Respiratory cultures: Negative-normal lola  Fungus Cultures: Penicillium species  Pneumonia panel by molecular PCR: Negative  Pneumocystis Jirovcci organisms: Negative  Aspergillus galactomannan: 0.06-negative  No malignant cells      CT chest with out contrast: Performed on 17 February 2025          CT scan of chest without IV contrast: Performed on 17 February 2025.                Units  RefRange   ---------------------------------------------------------------------   Final Report                        SEE NOTE           Mycobacterium

## 2025-02-24 ENCOUNTER — TELEPHONE (OUTPATIENT)
Dept: INFECTIOUS DISEASES | Age: 85
End: 2025-02-24

## 2025-02-24 NOTE — TELEPHONE ENCOUNTER
Called patient, to inform her that I called [facility] in [place] and they will fill her prescription for a month at a time.

## 2025-03-03 ENCOUNTER — OFFICE VISIT (OUTPATIENT)
Dept: INFECTIOUS DISEASES | Age: 85
End: 2025-03-03
Payer: MEDICARE

## 2025-03-03 VITALS
SYSTOLIC BLOOD PRESSURE: 138 MMHG | RESPIRATION RATE: 18 BRPM | BODY MASS INDEX: 21.49 KG/M2 | WEIGHT: 129 LBS | TEMPERATURE: 96.5 F | DIASTOLIC BLOOD PRESSURE: 80 MMHG | HEART RATE: 76 BPM | OXYGEN SATURATION: 99 % | HEIGHT: 65 IN

## 2025-03-03 DIAGNOSIS — A31.0 MYCOBACTERIUM AVIUM COMPLEX (HCC): Primary | ICD-10-CM

## 2025-03-03 DIAGNOSIS — R05.3 CHRONIC COUGH: ICD-10-CM

## 2025-03-03 DIAGNOSIS — R93.89 ABNORMAL CT OF THE CHEST: ICD-10-CM

## 2025-03-03 DIAGNOSIS — J18.9 PNEUMONIA OF RIGHT MIDDLE LOBE DUE TO INFECTIOUS ORGANISM: ICD-10-CM

## 2025-03-03 DIAGNOSIS — J47.9 BRONCHIECTASIS WITHOUT COMPLICATION (HCC): ICD-10-CM

## 2025-03-03 PROCEDURE — 1159F MED LIST DOCD IN RCRD: CPT | Performed by: STUDENT IN AN ORGANIZED HEALTH CARE EDUCATION/TRAINING PROGRAM

## 2025-03-03 PROCEDURE — 99214 OFFICE O/P EST MOD 30 MIN: CPT | Performed by: STUDENT IN AN ORGANIZED HEALTH CARE EDUCATION/TRAINING PROGRAM

## 2025-03-03 PROCEDURE — 1126F AMNT PAIN NOTED NONE PRSNT: CPT | Performed by: STUDENT IN AN ORGANIZED HEALTH CARE EDUCATION/TRAINING PROGRAM

## 2025-03-03 PROCEDURE — 1123F ACP DISCUSS/DSCN MKR DOCD: CPT | Performed by: STUDENT IN AN ORGANIZED HEALTH CARE EDUCATION/TRAINING PROGRAM

## 2025-03-03 NOTE — PROGRESS NOTES
of patient:     Patient Active Problem List   Diagnosis    Abnormal CT of the chest    Bronchiectasis without complication (HCC)    Chronic cough    Pneumonia of right middle lobe due to infectious organism             Singh Mcneil MD, MD, FACP 3/3/2025 7:45 AM

## 2025-07-07 ENCOUNTER — OFFICE VISIT (OUTPATIENT)
Dept: INFECTIOUS DISEASES | Age: 85
End: 2025-07-07
Payer: MEDICARE

## 2025-07-07 VITALS
TEMPERATURE: 97.6 F | WEIGHT: 131.2 LBS | RESPIRATION RATE: 20 BRPM | OXYGEN SATURATION: 98 % | HEIGHT: 65 IN | DIASTOLIC BLOOD PRESSURE: 70 MMHG | SYSTOLIC BLOOD PRESSURE: 130 MMHG | BODY MASS INDEX: 21.86 KG/M2 | HEART RATE: 82 BPM

## 2025-07-07 DIAGNOSIS — R05.3 CHRONIC COUGH: ICD-10-CM

## 2025-07-07 DIAGNOSIS — R93.89 ABNORMAL CT OF THE CHEST: ICD-10-CM

## 2025-07-07 DIAGNOSIS — A31.0 MYCOBACTERIUM AVIUM COMPLEX (HCC): Primary | ICD-10-CM

## 2025-07-07 DIAGNOSIS — J47.9 BRONCHIECTASIS WITHOUT COMPLICATION (HCC): ICD-10-CM

## 2025-07-07 DIAGNOSIS — J18.9 PNEUMONIA OF RIGHT MIDDLE LOBE DUE TO INFECTIOUS ORGANISM: ICD-10-CM

## 2025-07-07 PROBLEM — G57.02 NEUROPATHY OF LEFT SCIATIC NERVE: Status: ACTIVE | Noted: 2024-09-12

## 2025-07-07 PROBLEM — Z90.49 HISTORY OF CHOLECYSTECTOMY: Status: ACTIVE | Noted: 2025-02-17

## 2025-07-07 PROBLEM — M47.816 LUMBAR SPONDYLOSIS: Status: ACTIVE | Noted: 2025-07-07

## 2025-07-07 PROBLEM — M47.817 LUMBOSACRAL SPONDYLOSIS WITHOUT MYELOPATHY: Status: ACTIVE | Noted: 2025-07-07

## 2025-07-07 PROBLEM — I25.10 ARTERIOSCLEROSIS OF CORONARY ARTERY: Status: ACTIVE | Noted: 2025-02-17

## 2025-07-07 PROBLEM — M79.643 PAIN OF HAND: Status: ACTIVE | Noted: 2025-07-07

## 2025-07-07 PROBLEM — N94.9 FEMALE GENITAL SYMPTOMS: Status: ACTIVE | Noted: 2025-02-17

## 2025-07-07 PROBLEM — G89.4 CHRONIC PAIN DISORDER: Status: ACTIVE | Noted: 2025-07-07

## 2025-07-07 PROBLEM — R74.8 ABNORMAL LIVER ENZYMES: Status: ACTIVE | Noted: 2025-07-07

## 2025-07-07 PROCEDURE — 1123F ACP DISCUSS/DSCN MKR DOCD: CPT | Performed by: STUDENT IN AN ORGANIZED HEALTH CARE EDUCATION/TRAINING PROGRAM

## 2025-07-07 PROCEDURE — 1125F AMNT PAIN NOTED PAIN PRSNT: CPT | Performed by: STUDENT IN AN ORGANIZED HEALTH CARE EDUCATION/TRAINING PROGRAM

## 2025-07-07 PROCEDURE — 1159F MED LIST DOCD IN RCRD: CPT | Performed by: STUDENT IN AN ORGANIZED HEALTH CARE EDUCATION/TRAINING PROGRAM

## 2025-07-07 PROCEDURE — 99214 OFFICE O/P EST MOD 30 MIN: CPT | Performed by: STUDENT IN AN ORGANIZED HEALTH CARE EDUCATION/TRAINING PROGRAM

## 2025-07-07 NOTE — PROGRESS NOTES
evaluated on November 4 of this year.  She had previously underwent bronchoscopy on October 25.     Prior CT scan from September demonstrated evidence of inflammatory nodules in bilateral lungs with evidence of potential multi lobar pneumonia.  Bronchoscopy studies demonstrated negative acid-fast, fungal cultures, pneumonia panel, pneumocystis, CMV and HSV.  She was initiated on therapy with doxycycline 100 twice daily for pneumonia without significant improvement.  Fungal cultures did demonstrate evidence of a mold which is still in process.  Of note, on review of AFB culture obtained on October 25 there is evidence of Mycobacterium avium-intracellulare.  The mold is a penicillium species, this is more consistent with contamination.  I suspect the the nontuberculous Mycobacterium is the pathogenic species.    I have reviewed potential medication interactions. This patient cannot be placed on therapy with rifabutin  Rifabutin 300mg 3x weekly  Azithromycin 500mg 3x weekly  Ethambutol 1600mg 3x weekly     Due to mild elevation in liver function test, will require follow-up with GI service though I would note that the effect of these antimycobacterial agents can affect hepatic function      OBJECTIVE   VITALS   /70 (BP Site: Left Upper Arm, Patient Position: Sitting, BP Cuff Size: Medium Adult)   Pulse 82   Temp 97.6 °F (36.4 °C) (Oral)   Resp 20   Ht 1.651 m (5' 5\")   Wt 59.5 kg (131 lb 3.2 oz)   SpO2 98%   BMI 21.83 kg/m²       Wt Readings from Last 3 Encounters:   07/07/25 59.5 kg (131 lb 3.2 oz)   04/15/25 59.3 kg (130 lb 12.8 oz)   03/03/25 58.5 kg (129 lb)         General Appearance  Awake, alert, oriented,  not  In acute distress  HEENT - normocephalic, atraumatic, pink conjunctiva,  anicteric sclera  Neck - Supple, no mass  Lungs -  Bilateral good air entry, no rhonchi, no wheeze  Cardiovascular - Heart sounds are normal.  Regular rate and rhythm without murmur, gallop or rub.  Abdomen - soft, not

## 2025-07-09 ENCOUNTER — TELEPHONE (OUTPATIENT)
Dept: INFECTIOUS DISEASES | Age: 85
End: 2025-07-09

## 2025-07-09 NOTE — TELEPHONE ENCOUNTER
I called Erika today to schedule her CT and she had said that Agnes ordered one with high contrast and we said that we would probably just use that one so she wouldn't have to do two CT's so is that okay if we just use that one? Please advise.

## 2025-07-30 ENCOUNTER — TELEPHONE (OUTPATIENT)
Dept: PULMONOLOGY | Age: 85
End: 2025-07-30

## 2025-07-30 NOTE — TELEPHONE ENCOUNTER
Phoned Oscar at [phone] to start prior authorization request for CT chest high resolution scheduled for 8/19/25 at [facility].     Spoke with Yadi ARELLANO with [employer].  After going through clinical question over the phone approval was received for chest CT.     Authorization number: [id number]  Valid: 7/30/25-1/26/26  Case number: [id number]

## 2025-07-30 NOTE — TELEPHONE ENCOUNTER
Cover sheet with insurance communication and CT order faxed to Lucita at [employer] (see media).

## 2025-08-06 ENCOUNTER — TELEPHONE (OUTPATIENT)
Dept: INFECTIOUS DISEASES | Age: 85
End: 2025-08-06

## (undated) DEVICE — CONMED DISPOSABLE MICROBIOLOGY BRUSH, Ø1 MM, 1.8 MM WORKING DIAMETER, 110 CM LENGTH: Brand: CONMED

## (undated) DEVICE — TUBING, SUCTION, 1/4" X 6', STRAIGHT: Brand: MEDLINE

## (undated) DEVICE — BRUSH CYTO L140CM DIA1.5MM WRK CHN 2MM BRIST SHTH RNG HNDL

## (undated) DEVICE — TRAP,MUCUS SPECIMEN, 80CC: Brand: MEDLINE

## (undated) DEVICE — LINER SUCT CANSTR 1500CC SEMI RIG W/ POR HYDROPHOBIC SHUT

## (undated) DEVICE — CONTAINER,SPECIMEN,PNEU TUBE,4OZ,OR STRL: Brand: MEDLINE

## (undated) DEVICE — SYRINGE, 10ML SALINE IN 10ML: Brand: MEDLINE

## (undated) DEVICE — SOLUTION IV IRRIG POUR BRL 0.9% SODIUM CHL 2F7124

## (undated) DEVICE — ELECTRODE,ECG,STRESS,FOAM,3PK: Brand: MEDLINE

## (undated) DEVICE — FORCEPS BX L100CM DIA1.8MM WRK CHN 2MM PULM S STL RAD JAW 4

## (undated) DEVICE — SINGLE USE BIOPSY VALVE MAJ-210: Brand: SINGLE USE BIOPSY VALVE (STERILE)

## (undated) DEVICE — KIT INF CTRL 2OZ LUB TBNG L12FT DBL END BRSH SYR OP4

## (undated) DEVICE — SINGLE USE SUCTION VALVE MAJ-209: Brand: SINGLE USE SUCTION VALVE (STERILE)

## (undated) DEVICE — Z INACTIVE USE 2735373 APPLICATOR FBR LAIN COT WOOD TIP ECONOMICAL